# Patient Record
Sex: FEMALE | Race: WHITE | NOT HISPANIC OR LATINO | Employment: OTHER | URBAN - NONMETROPOLITAN AREA
[De-identification: names, ages, dates, MRNs, and addresses within clinical notes are randomized per-mention and may not be internally consistent; named-entity substitution may affect disease eponyms.]

---

## 2017-04-14 ENCOUNTER — DOCUMENTATION (OUTPATIENT)
Dept: GASTROENTEROLOGY | Facility: CLINIC | Age: 57
End: 2017-04-14

## 2017-04-14 NOTE — PROGRESS NOTES
Spoke to pt to see if she would still like to have a repeat carcinoid colon pt stated that she believed that she had already had it done and had her follow up but if not she would like to receive a letter about what she needed to have done.

## 2017-08-03 ENCOUNTER — TRANSCRIBE ORDERS (OUTPATIENT)
Dept: ADMINISTRATIVE | Facility: HOSPITAL | Age: 57
End: 2017-08-03

## 2017-08-03 ENCOUNTER — HOSPITAL ENCOUNTER (OUTPATIENT)
Dept: CT IMAGING | Facility: HOSPITAL | Age: 57
Discharge: HOME | End: 2017-08-03
Admitting: INTERNAL MEDICINE

## 2017-08-03 DIAGNOSIS — R93.3 ABNORMAL CT SCAN, GASTROINTESTINAL TRACT: Primary | ICD-10-CM

## 2017-08-03 DIAGNOSIS — R93.3 ABNORMAL CT SCAN, GASTROINTESTINAL TRACT: ICD-10-CM

## 2017-08-03 PROCEDURE — 0 IOPAMIDOL PER 1 ML: Performed by: INTERNAL MEDICINE

## 2017-08-03 PROCEDURE — 74178 CT ABD&PLV WO CNTR FLWD CNTR: CPT

## 2017-08-03 RX ADMIN — IOPAMIDOL 130 ML: 755 INJECTION, SOLUTION INTRAVENOUS at 17:30

## 2017-08-03 RX ADMIN — BARIUM SULFATE 1350 ML: 1 SUSPENSION ORAL at 17:30

## 2017-08-09 ENCOUNTER — DOCUMENTATION (OUTPATIENT)
Dept: GASTROENTEROLOGY | Facility: CLINIC | Age: 57
End: 2017-08-09

## 2017-08-09 NOTE — PROGRESS NOTES
Called patient, noticed she was on the recall for Dr. Trevizo's office. Called to see if she needs an appt or if she is following with another provider. Left message for her to call if she needs anything.

## 2017-08-31 ENCOUNTER — OFFICE VISIT (OUTPATIENT)
Dept: CARDIOLOGY | Facility: CLINIC | Age: 57
End: 2017-08-31

## 2017-08-31 VITALS
HEIGHT: 65 IN | WEIGHT: 147.4 LBS | DIASTOLIC BLOOD PRESSURE: 80 MMHG | BODY MASS INDEX: 24.56 KG/M2 | OXYGEN SATURATION: 98 % | SYSTOLIC BLOOD PRESSURE: 134 MMHG | HEART RATE: 91 BPM

## 2017-08-31 DIAGNOSIS — R06.02 SHORTNESS OF BREATH: Primary | ICD-10-CM

## 2017-08-31 DIAGNOSIS — I47.1 SVT (SUPRAVENTRICULAR TACHYCARDIA) (HCC): ICD-10-CM

## 2017-08-31 DIAGNOSIS — R00.2 PALPITATIONS: ICD-10-CM

## 2017-08-31 PROCEDURE — 99213 OFFICE O/P EST LOW 20 MIN: CPT | Performed by: PHYSICIAN ASSISTANT

## 2017-08-31 RX ORDER — DULOXETIN HYDROCHLORIDE 30 MG/1
30 CAPSULE, DELAYED RELEASE ORAL DAILY
COMMUNITY
Start: 2017-08-09

## 2017-08-31 RX ORDER — MAGNESIUM 200 MG
200 TABLET ORAL DAILY
COMMUNITY
End: 2021-12-14 | Stop reason: ALTCHOICE

## 2017-12-21 ENCOUNTER — APPOINTMENT (OUTPATIENT)
Dept: WOMENS IMAGING | Facility: HOSPITAL | Age: 57
End: 2017-12-21

## 2017-12-21 PROCEDURE — 77063 BREAST TOMOSYNTHESIS BI: CPT | Performed by: RADIOLOGY

## 2017-12-21 PROCEDURE — 77067 SCR MAMMO BI INCL CAD: CPT | Performed by: RADIOLOGY

## 2019-05-23 ENCOUNTER — APPOINTMENT (OUTPATIENT)
Dept: WOMENS IMAGING | Facility: HOSPITAL | Age: 59
End: 2019-05-23

## 2019-05-23 PROCEDURE — 77067 SCR MAMMO BI INCL CAD: CPT | Performed by: RADIOLOGY

## 2019-05-23 PROCEDURE — 77063 BREAST TOMOSYNTHESIS BI: CPT | Performed by: RADIOLOGY

## 2019-06-26 ENCOUNTER — TELEPHONE (OUTPATIENT)
Dept: GASTROENTEROLOGY | Facility: CLINIC | Age: 59
End: 2019-06-26

## 2019-07-01 NOTE — TELEPHONE ENCOUNTER
I faxed request back to pharmacy stating that she has not been seen and would need an appointment since I am unable to reach patient.

## 2020-06-23 ENCOUNTER — APPOINTMENT (OUTPATIENT)
Dept: WOMENS IMAGING | Facility: HOSPITAL | Age: 60
End: 2020-06-23

## 2020-06-23 PROCEDURE — 77066 DX MAMMO INCL CAD BI: CPT | Performed by: RADIOLOGY

## 2020-06-23 PROCEDURE — 76641 ULTRASOUND BREAST COMPLETE: CPT | Performed by: RADIOLOGY

## 2020-06-23 PROCEDURE — 77062 BREAST TOMOSYNTHESIS BI: CPT | Performed by: RADIOLOGY

## 2021-12-14 ENCOUNTER — OFFICE VISIT (OUTPATIENT)
Dept: CARDIOLOGY | Facility: CLINIC | Age: 61
End: 2021-12-14

## 2021-12-14 VITALS
TEMPERATURE: 97.5 F | WEIGHT: 166 LBS | HEART RATE: 79 BPM | BODY MASS INDEX: 27.66 KG/M2 | HEIGHT: 65 IN | SYSTOLIC BLOOD PRESSURE: 158 MMHG | DIASTOLIC BLOOD PRESSURE: 90 MMHG

## 2021-12-14 DIAGNOSIS — R42 DIZZINESS: ICD-10-CM

## 2021-12-14 DIAGNOSIS — C7A.012 MALIGNANT CARCINOID TUMOR OF ILEUM (HCC): ICD-10-CM

## 2021-12-14 DIAGNOSIS — E55.9 VITAMIN D DEFICIENCY: ICD-10-CM

## 2021-12-14 DIAGNOSIS — E78.00 HYPERCHOLESTEREMIA: ICD-10-CM

## 2021-12-14 DIAGNOSIS — R53.82 CHRONIC FATIGUE: ICD-10-CM

## 2021-12-14 DIAGNOSIS — R00.2 PALPITATIONS: ICD-10-CM

## 2021-12-14 DIAGNOSIS — E88.81 METABOLIC SYNDROME: ICD-10-CM

## 2021-12-14 DIAGNOSIS — G89.29 CHRONIC CHEST PAIN: Primary | ICD-10-CM

## 2021-12-14 DIAGNOSIS — I10 PRIMARY HYPERTENSION: ICD-10-CM

## 2021-12-14 DIAGNOSIS — E03.9 HYPOTHYROIDISM, UNSPECIFIED TYPE: ICD-10-CM

## 2021-12-14 DIAGNOSIS — R07.9 CHRONIC CHEST PAIN: Primary | ICD-10-CM

## 2021-12-14 PROBLEM — E88.810 METABOLIC SYNDROME: Status: ACTIVE | Noted: 2021-12-14

## 2021-12-14 PROCEDURE — 93000 ELECTROCARDIOGRAM COMPLETE: CPT | Performed by: INTERNAL MEDICINE

## 2021-12-14 PROCEDURE — 99204 OFFICE O/P NEW MOD 45 MIN: CPT | Performed by: INTERNAL MEDICINE

## 2021-12-14 RX ORDER — DICYCLOMINE HYDROCHLORIDE 10 MG/1
10 CAPSULE ORAL 2 TIMES DAILY
Qty: 60 CAPSULE | Refills: 3 | Status: SHIPPED | OUTPATIENT
Start: 2021-12-14 | End: 2022-04-13

## 2021-12-14 RX ORDER — NABUMETONE 500 MG/1
500 TABLET, FILM COATED ORAL 2 TIMES DAILY
COMMUNITY
End: 2022-12-20

## 2021-12-14 RX ORDER — MONTELUKAST SODIUM 10 MG/1
10 TABLET ORAL NIGHTLY
COMMUNITY

## 2021-12-14 RX ORDER — AZELASTINE 1 MG/ML
2 SPRAY, METERED NASAL 2 TIMES DAILY
COMMUNITY

## 2021-12-14 RX ORDER — LEVOCETIRIZINE DIHYDROCHLORIDE 5 MG/1
5 TABLET, FILM COATED ORAL EVERY EVENING
COMMUNITY

## 2021-12-14 RX ORDER — MULTIPLE VITAMINS W/ MINERALS TAB 9MG-400MCG
1 TAB ORAL DAILY
COMMUNITY

## 2021-12-14 RX ORDER — ATORVASTATIN CALCIUM 10 MG/1
10 TABLET, FILM COATED ORAL NIGHTLY
COMMUNITY

## 2021-12-14 RX ORDER — LEVOTHYROXINE SODIUM 0.05 MG/1
50 TABLET ORAL
COMMUNITY

## 2021-12-14 RX ORDER — EPINEPHRINE 0.3 MG/.3ML
0.3 INJECTION SUBCUTANEOUS ONCE
COMMUNITY

## 2021-12-14 RX ORDER — LISINOPRIL 10 MG/1
10 TABLET ORAL DAILY
COMMUNITY
End: 2022-05-11 | Stop reason: SINTOL

## 2021-12-14 RX ORDER — OMEPRAZOLE 20 MG/1
20 CAPSULE, DELAYED RELEASE ORAL DAILY
COMMUNITY
End: 2022-12-20

## 2021-12-14 NOTE — PATIENT INSTRUCTIONS
Mediterranean Diet  A Mediterranean diet refers to food and lifestyle choices that are based on the traditions of countries located on the [place]. This way of eating has been shown to help prevent certain conditions and improve outcomes for people who have chronic diseases, like kidney disease and heart disease.  What are tips for following this plan?  Lifestyle  · Cook and eat meals together with your family, when possible.  · Drink enough fluid to keep your urine clear or pale yellow.  · Be physically active every day. This includes:  ? Aerobic exercise like running or swimming.  ? Leisure activities like gardening, walking, or housework.  · Get 7-8 hours of sleep each night.  · If recommended by your health care provider, drink red wine in moderation. This means 1 glass a day for nonpregnant women and 2 glasses a day for men. A glass of wine equals 5 oz (150 mL).  Reading food labels    · Check the serving size of packaged foods. For foods such as rice and pasta, the serving size refers to the amount of cooked product, not dry.  · Check the total fat in packaged foods. Avoid foods that have saturated fat or trans fats.  · Check the ingredients list for added sugars, such as corn syrup.    Shopping  · At the grocery store, buy most of your food from the areas near the walls of the store. This includes:  ? Fresh fruits and vegetables (produce).  ? Grains, beans, nuts, and seeds. Some of these may be available in unpackaged forms or large amounts (in bulk).  ? Fresh seafood.  ? Poultry and eggs.  ? Low-fat dairy products.  · Buy whole ingredients instead of prepackaged foods.  · Buy fresh fruits and vegetables in-season from local farmers markets.  · Buy frozen fruits and vegetables in resealable bags.  · If you do not have access to quality fresh seafood, buy precooked frozen shrimp or canned fish, such as tuna, salmon, or sardines.  · Buy small amounts of raw or cooked vegetables, salads, or olives from  the deli or salad bar at your store.  · Stock your pantry so you always have certain foods on hand, such as olive oil, canned tuna, canned tomatoes, rice, pasta, and beans.  Cooking  · Cook foods with extra-virgin olive oil instead of using butter or other vegetable oils.  · Have meat as a side dish, and have vegetables or grains as your main dish. This means having meat in small portions or adding small amounts of meat to foods like pasta or stew.  · Use beans or vegetables instead of meat in common dishes like chili or lasagna.  · [place] with different cooking methods. Try roasting or broiling vegetables instead of steaming or sautéeing them.  · Add frozen vegetables to soups, stews, pasta, or rice.  · Add nuts or seeds for added healthy fat at each meal. You can add these to yogurt, salads, or vegetable dishes.  · Marinate fish or vegetables using olive oil, lemon juice, garlic, and fresh herbs.  Meal planning    · Plan to eat 1 vegetarian meal one day each week. Try to work up to 2 vegetarian meals, if possible.  · Eat seafood 2 or more times a week.  · Have healthy snacks readily available, such as:  ? Vegetable sticks with hummus.  ? Greek yogurt.  ? Fruit and nut trail mix.  · Eat balanced meals throughout the week. This includes:  ? Fruit: 2-3 servings a day  ? Vegetables: 4-5 servings a day  ? Low-fat dairy: 2 servings a day  ? Fish, poultry, or lean meat: 1 serving a day  ? Beans and legumes: 2 or more servings a week  ? Nuts and seeds: 1-2 servings a day  ? Whole grains: 6-8 servings a day  ? Extra-virgin olive oil: 3-4 servings a day  · Limit red meat and sweets to only a few servings a month    What are my food choices?  · Mediterranean diet  ? Recommended  § Grains: Whole-grain pasta. Brown rice. Bulgar wheat. Polenta. Couscous. Whole-wheat bread. Oatmeal. Quinoa.  § Vegetables: Artichokes. Beets. Broccoli. Cabbage. Carrots. Eggplant. Green beans. Chard. Kale. Spinach. Onions. Leeks. Peas. Squash.  Tomatoes. Peppers. Radishes.  § Fruits: Apples. Apricots. Avocado. Berries. Bananas. Cherries. Dates. Figs. Grapes. Rashid. Melon. Oranges. Peaches. Plums. Pomegranate.  § Meats and other protein foods: Beans. Almonds. Sunflower seeds. Pine nuts. Peanuts. Cod. [place]. Scallops. Shrimp. Tuna. Tilapia. Clams. Oysters. Eggs.  § Dairy: Low-fat milk. Cheese. Greek yogurt.  § Beverages: Water. Red wine. Herbal tea.  § Fats and oils: Extra virgin olive oil. Avocado oil. Grape seed oil.  § Sweets and desserts: Greek yogurt with honey. Baked apples. Poached pears. Trail mix.  § Seasoning and other foods: Basil. Cilantro. Coriander. Cumin. Mint. Parsley. Peter. Rosemary. Tarragon. Garlic. Oregano. Thyme. Pepper. Balsalmic vinegar. Tahini. Hummus. Tomato sauce. Olives. Mushrooms.  ? Limit these  § Grains: Prepackaged pasta or rice dishes. Prepackaged cereal with added sugar.  § Vegetables: Deep fried potatoes (french fries).  § Fruits: Fruit canned in syrup.  § Meats and other protein foods: Beef. Pork. Lamb. Poultry with skin. Hot dogs. [place].  § Dairy: Ice cream. Sour cream. Whole milk.  § Beverages: Juice. Sugar-sweetened soft drinks. Beer. Liquor and spirits.  § Fats and oils: Butter. Canola oil. Vegetable oil. Beef fat (tallow). Lard.  § Sweets and desserts: Cookies. Cakes. Pies. Candy.  § Seasoning and other foods: Mayonnaise. Premade sauces and marinades.  The items listed may not be a complete list. Talk with your dietitian about what dietary choices are right for you.  Summary  · The Mediterranean diet includes both food and lifestyle choices.  · Eat a variety of fresh fruits and vegetables, beans, nuts, seeds, and whole grains.  · Limit the amount of red meat and sweets that you eat.  · Talk with your health care provider about whether it is safe for you to drink red wine in moderation. This means 1 glass a day for nonpregnant women and 2 glasses a day for men. A glass of wine equals 5 oz (150 mL).  This information  is not intended to replace advice given to you by your health care provider. Make sure you discuss any questions you have with your health care provider.  Document Revised: 08/17/2017 Document Reviewed: 08/10/2017  Elsevier Patient Education © 2020 [employer]

## 2021-12-14 NOTE — PROGRESS NOTES
Chief Complaint   Patient presents with   • Establish Care     Referred per PCP for HTN  and chest pressure . Patient had previously been seen by  ,had  stress test 2014.  patient had been seen by you many years ago.    • Chest Pain     Describes chest pressure and sharp pain at mid chest .   • Hypertension     Patient reports her Blood pressure has been elevated for months. at times Systolic 200 and [id number]   • Dizziness     Reports she has dizzziness , which she feels could be side effect from Lisinopril ( which she has just started )   • LABS     Has labs June 2021 in referral pack. Had labs november 2021 per PCP ,I will call for results.        CARDIAC COMPLAINTS  chest pressure/discomfort, dyspnea and Dizziness      Subjective   Radha Villa is a 61 y.o. female came in today for her initial cardiac evaluation.  She has history of hypertension, hypothyroidism, hypercholesterolemia who has been having symptoms of chest pain, dizziness, shortness of breath.  The chest pain is a sharp pain in the middle part of the chest associated with the chest tightness.  The symptoms occurs both during exertion as well as at rest.  It last for few minutes and subsides spontaneously.  She also has been noticing increasing her dyspnea and having some episodes of dizziness.  She has been diagnosed with hypertension and lisinopril was started.  She used to have systolic blood pressure more than 200 and diastolic of more than 100 and after being on the lisinopril her blood pressure is doing better but she started noticing increasing the dizziness.  She also had some lab work done and found her blood sugar was borderline elevated.  Her renal function was mildly reduced at 58.  Her cholesterol is 185 with the LDL around 95 and triglyceride of 161.  Her vitamin levels were normal and the TSH was normal.  She has history of carcinoid tumor for which he underwent surgery many years ago.  His carcinoid tumor was in the  ileum diagnosed after being in the hospital and was seen by multiple surgeons and gastroenterologist for abdominal pain.  She also has been noticing increasing fatigue and tired.  She wakes up in the morning feeling still tired.  She has no history of smoking or drinking alcohol.  She does have a family history of heart disease and atrial fibrillation mostly in the mother side    Past Surgical History:   Procedure Laterality Date   • APPENDECTOMY     • CARDIOVASCULAR STRESS TEST  04/11/2002    @ [facility].- 72% THR. EF 71%. Negative   • CARDIOVASCULAR STRESS TEST  08/25/2014    Dr. Cantrell- 6 Min.30 Sec. 7.6 METS. 81% THR.EF 77%. Negative   • CHOLECYSTECTOMY     • COLON SURGERY     • COLONOSCOPY     • COLONOSCOPY N/A 9/13/2016    Procedure: COLONOSCOPY FOR SCREENING CPT CODE: ;  Surgeon: Milagros Trevizo DO;  Location: [facility];  Service:    • DILATION AND CURETTAGE, DIAGNOSTIC / THERAPEUTIC     • EYE SURGERY     • HYSTERECTOMY         Current Outpatient Medications   Medication Sig Dispense Refill   • atorvastatin (LIPITOR) 10 MG tablet Take 10 mg by mouth Every Night.     • azelastine (ASTELIN) 0.1 % nasal spray 2 sprays into the nostril(s) as directed by provider 2 (Two) Times a Day. Use in each nostril as directed     • Cholecalciferol (Vitamin D3) 1.25 MG (46895 UT) tablet Take 1 tablet by mouth 1 (One) Time Per Week.     • DULoxetine (CYMBALTA) 30 MG capsule Take 30 mg by mouth Daily.     • ELDERBERRY PO Take  by mouth As Needed.     • EPINEPHrine (EPIPEN) 0.3 MG/0.3ML solution auto-injector injection Inject 0.3 mg under the skin into the appropriate area as directed 1 (One) Time.     • levocetirizine (XYZAL) 5 MG tablet Take 5 mg by mouth Every Evening.     • levothyroxine (SYNTHROID, LEVOTHROID) 50 MCG tablet Take 50 mcg by mouth Every Morning.     • lidocaine-hydrocortisone 3-0.5 % cream rectal cream Use rectally twice daily as needed. 1 tube 2   • lisinopril (PRINIVIL,ZESTRIL) 10 MG tablet Take  10 mg by mouth Daily.     • montelukast (SINGULAIR) 10 MG tablet Take 10 mg by mouth Every Night.     • Multiple Vitamins-Minerals (EMERGEN-C IMMUNE PLUS PO) Take  by mouth As Needed.     • multivitamin with minerals (MULTIVITAMIN ADULT PO) Take 1 tablet by mouth Daily.     • nabumetone (RELAFEN) 500 MG tablet Take 500 mg by mouth 2 (Two) Times a Day.     • nitroglycerin (NITROSTAT) 0.4 MG SL tablet Place 0.4 mg under the tongue Every 5 (Five) Minutes As Needed.     • omeprazole (priLOSEC) 20 MG capsule Take 20 mg by mouth Daily.     • triamcinolone (KENALOG) 0.1 % cream Apply  topically.     • dicyclomine (Bentyl) 10 MG capsule Take 1 capsule by mouth 2 (Two) Times a Day. 60 capsule 3     No current facility-administered medications for this visit.           ALLERGIES:  Codeine    Past Medical History:   Diagnosis Date   • Arthritis    • Borderline diabetic    • Cancer (HCC)     CARCINOID TUMOR   • Carcinoid tumor     ileum   • Diabetes mellitus (HCC)     BORDERLINE   • Hyperlipidemia    • Hypertension    • Hypothyroidism    • Hypothyroidism 12/14/2021   • PONV (postoperative nausea and vomiting)    • Regurgitation    • Tachycardia    • Vitamin D deficiency        Social History     Tobacco Use   Smoking Status Never Smoker   Smokeless Tobacco Never Used          Family History   Problem Relation Age of Onset   • Heart attack Other    • Diabetes Other    • Stroke Other    • Cancer Other    • Heart disease Other         Cardiac disorder   • Heart disease Mother    • Atrial fibrillation Mother    • Skin cancer Mother    • Heart disease Maternal Grandfather    • Heart disease Paternal Grandmother        Review of Systems   Constitutional: Positive for malaise/fatigue. Negative for decreased appetite.   HENT: Negative for congestion and sore throat.    Eyes: Negative for blurred vision.   Cardiovascular: Positive for chest pain, dyspnea on exertion and palpitations.   Respiratory: Positive for shortness of breath.  "Negative for snoring.    Endocrine: Negative for cold intolerance and heat intolerance.   Hematologic/Lymphatic: Negative for adenopathy. Does not bruise/bleed easily.   Skin: Negative for itching, nail changes and skin cancer.   Musculoskeletal: Negative for arthritis and myalgias.   Gastrointestinal: Negative for abdominal pain, dysphagia and heartburn.   Genitourinary: Negative for bladder incontinence and frequency.   Neurological: Negative for dizziness, light-headedness, seizures and vertigo.   Psychiatric/Behavioral: Negative for altered mental status.   Allergic/Immunologic: Negative for environmental allergies and hives.       Diabetes- No  Thyroid- abnormal    Objective     /90 (BP Location: Left arm)   Pulse 79   Temp 97.5 °F (36.4 °C)   Ht 165.1 cm (65\")   Wt 75.3 kg (166 lb)   BMI 27.62 kg/m²     Vitals and nursing note reviewed.   Constitutional:       Appearance: Healthy appearance. Not in distress.   Eyes:      Conjunctiva/sclera: Conjunctivae normal.      Pupils: Pupils are equal, round, and reactive to light.   HENT:      Head: Normocephalic.   Pulmonary:      Effort: Pulmonary effort is normal.      Breath sounds: Normal breath sounds.   Cardiovascular:      PMI at left midclavicular line. Normal rate. Regular rhythm.      Murmurs: There is a systolic murmur.   Abdominal:      General: Bowel sounds are normal.      Palpations: Abdomen is soft.   Musculoskeletal: Normal range of motion.      Cervical back: Normal range of motion and neck supple. Skin:     General: Skin is warm and dry.   Neurological:      Mental Status: Alert, oriented to person, place, and time and oriented to person, place and time.           ECG 12 Lead    Date/Time: 12/14/2021 1:35 PM  Performed by: Deya Ang MD  Authorized by: Deya Ang MD   Comparison: compared with previous ECG from 11/19/2021  Similar to previous ECG  Rhythm: sinus rhythm  Rate: normal  Other findings: non-specific ST-T " wave changes    Clinical impression: non-specific ECG              Assessment/Plan   Patient's Body mass index is 27.62 kg/m². indicating that she is overweight (BMI 25-29.9). Obesity-related health conditions include the following: hypertension and dyslipidemias. Obesity is newly identified. BMI is is above average; BMI management plan is completed. We discussed low calorie, low carb based diet program, portion control and increasing exercise..     Diagnoses and all orders for this visit:    1. Chronic chest pain (Primary)  -     Stress Test With Myocardial Perfusion One Day; Future  -     Adult Transthoracic Echo Complete W/ Cont if Necessary Per Protocol; Future  -     Holter Monitor - 72 Hour Up To 15 Days; Future  -     dicyclomine (Bentyl) 10 MG capsule; Take 1 capsule by mouth 2 (Two) Times a Day.  Dispense: 60 capsule; Refill: 3    2. Primary hypertension    3. Hypercholesteremia  -     Stress Test With Myocardial Perfusion One Day; Future    4. Hypothyroidism, unspecified type    5. Malignant carcinoid tumor of ileum (HCC)    6. Vitamin D deficiency    7. Dizziness  -     Adult Transthoracic Echo Complete W/ Cont if Necessary Per Protocol; Future  -     Holter Monitor - 72 Hour Up To 15 Days; Future    8. Chronic fatigue  -     Overnight Sleep Oximetry Study; Future    9. Palpitations  -     Holter Monitor - 72 Hour Up To 15 Days; Future    10. Metabolic syndrome    At baseline her heart rate is stable.  Her blood pressure is upper limit of normal.  Her EKG done today shows sinus rhythm with nonspecific ST-T changes.  Her clinical examination reveals a BMI of 28.  Her cardiovascular examination is unremarkable other than a short systolic murmur at the mitral area.    Regarding the chest pain, it appears to be atypical.  It could be secondary to her reflux for which she is already on Prilosec.  She does have multiple risk factors for CAD which includes blood pressure, cholesterol and family history.  I  scheduled her to undergo a stress test to evaluate her functional status, chronotropic response, blood pressure response and rule out any stress-induced ischemia.  I also schedule her to undergo an echocardiogram to evaluate the LV function, valvular structures as well as the PA pressure.  Meanwhile I started her on Bentyl 10 mg to be taken twice a day.    Regarding her hypertension, lisinopril seems to be adequately controlling the blood pressure with mild elevation when she goes to the doctor's office.  During the stress test if she does have increased chronotropic as well as blood pressure response then low-dose of beta-blockers may be added.    Regarding her hypercholesterolemia, she is on Lipitor at 10 mg and appears to have a normal levels.  Continue the same    Regarding her hypothyroidism, she is taking supplements and the level seems to be normal    Regarding the carcinoid tumor of the ileum, she had surgery done without complication and she is not having any symptoms at this time    Regarding her dizziness, I like to get Holter monitor placed since he also has lot of palpitation.  This is to evaluate for any arrhythmia as well as will review the echocardiogram    Regarding the fatigue and tiredness, associated with the mild obesity, need to rule out sleep apnea.  Advised her to check her nocturnal pulse PO2 measurement.  I also talked to her about diet especially Mediterranean diet.    Based on the results, further recommendations will be made.               Electronically signed by Deya Ang MD December 14, 2021 13:34 EST

## 2021-12-22 ENCOUNTER — HOSPITAL ENCOUNTER (OUTPATIENT)
Dept: CARDIOLOGY | Facility: HOSPITAL | Age: 61
Discharge: HOME | End: 2021-12-22

## 2021-12-22 DIAGNOSIS — E78.00 HYPERCHOLESTEREMIA: ICD-10-CM

## 2021-12-22 DIAGNOSIS — G89.29 CHRONIC CHEST PAIN: ICD-10-CM

## 2021-12-22 DIAGNOSIS — R07.9 CHRONIC CHEST PAIN: ICD-10-CM

## 2021-12-22 DIAGNOSIS — R42 DIZZINESS: ICD-10-CM

## 2021-12-22 LAB
BH CV ECHO MEAS - ACS: 1.8 CM
BH CV ECHO MEAS - AO MAX PG: 3.9 MMHG
BH CV ECHO MEAS - AO MEAN PG: 2 MMHG
BH CV ECHO MEAS - AO ROOT AREA (BSA CORRECTED): 1.5
BH CV ECHO MEAS - AO ROOT AREA: 5.7 CM^2
BH CV ECHO MEAS - AO ROOT DIAM: 2.7 CM
BH CV ECHO MEAS - AO V2 MAX: 99 CM/SEC
BH CV ECHO MEAS - AO V2 MEAN: 73.1 CM/SEC
BH CV ECHO MEAS - AO V2 VTI: 23.4 CM
BH CV ECHO MEAS - BSA(HAYCOCK): 1.9 M^2
BH CV ECHO MEAS - BSA: 1.8 M^2
BH CV ECHO MEAS - BZI_BMI: 27.6 KILOGRAMS/M^2
BH CV ECHO MEAS - BZI_METRIC_HEIGHT: 165.1 CM
BH CV ECHO MEAS - BZI_METRIC_WEIGHT: 75.3 KG
BH CV ECHO MEAS - EDV(CUBED): 32.2 ML
BH CV ECHO MEAS - EDV(MOD-SP4): 66.9 ML
BH CV ECHO MEAS - EDV(TEICH): 40.3 ML
BH CV ECHO MEAS - EF(CUBED): 68.2 %
BH CV ECHO MEAS - EF(MOD-SP4): 54.6 %
BH CV ECHO MEAS - EF(TEICH): 61.2 %
BH CV ECHO MEAS - EF_3D-VOL: 63 %
BH CV ECHO MEAS - ESV(CUBED): 10.2 ML
BH CV ECHO MEAS - ESV(MOD-SP4): 30.4 ML
BH CV ECHO MEAS - ESV(TEICH): 15.7 ML
BH CV ECHO MEAS - FS: 31.8 %
BH CV ECHO MEAS - IVS/LVPW: 1.2
BH CV ECHO MEAS - IVSD: 1.9 CM
BH CV ECHO MEAS - LA DIMENSION: 3.3 CM
BH CV ECHO MEAS - LA/AO: 1.2
BH CV ECHO MEAS - LV DIASTOLIC VOL/BSA (35-75): 36.6 ML/M^2
BH CV ECHO MEAS - LV IVRT: 0.11 SEC
BH CV ECHO MEAS - LV MASS(C)D: 205.5 GRAMS
BH CV ECHO MEAS - LV MASS(C)DI: 112.5 GRAMS/M^2
BH CV ECHO MEAS - LV SYSTOLIC VOL/BSA (12-30): 16.6 ML/M^2
BH CV ECHO MEAS - LVIDD: 3.2 CM
BH CV ECHO MEAS - LVIDS: 2.2 CM
BH CV ECHO MEAS - LVLD AP4: 7 CM
BH CV ECHO MEAS - LVLS AP4: 6.3 CM
BH CV ECHO MEAS - LVOT AREA (M): 3.1 CM^2
BH CV ECHO MEAS - LVOT AREA: 3.1 CM^2
BH CV ECHO MEAS - LVOT DIAM: 2 CM
BH CV ECHO MEAS - LVPWD: 1.5 CM
BH CV ECHO MEAS - MV A MAX VEL: 102 CM/SEC
BH CV ECHO MEAS - MV DEC SLOPE: 596 CM/SEC^2
BH CV ECHO MEAS - MV E MAX VEL: 114 CM/SEC
BH CV ECHO MEAS - MV E/A: 1.1
BH CV ECHO MEAS - RAP SYSTOLE: 10 MMHG
BH CV ECHO MEAS - RVDD: 2.7 CM
BH CV ECHO MEAS - RVSP: 33.4 MMHG
BH CV ECHO MEAS - SI(AO): 73.3 ML/M^2
BH CV ECHO MEAS - SI(CUBED): 12 ML/M^2
BH CV ECHO MEAS - SI(MOD-SP4): 20 ML/M^2
BH CV ECHO MEAS - SI(TEICH): 13.5 ML/M^2
BH CV ECHO MEAS - SV(AO): 134 ML
BH CV ECHO MEAS - SV(CUBED): 21.9 ML
BH CV ECHO MEAS - SV(MOD-SP4): 36.5 ML
BH CV ECHO MEAS - SV(TEICH): 24.7 ML
BH CV ECHO MEAS - TR MAX VEL: 242 CM/SEC
BH CV REST NUCLEAR ISOTOPE DOSE: 10 MCI
BH CV STRESS NUCLEAR ISOTOPE DOSE: 30 MCI
BH CV STRESS RECOVERY BP: NORMAL MMHG
BH CV STRESS RECOVERY HR: 74 BPM
LV EF NUC BP: 81 %
MAXIMAL PREDICTED HEART RATE: 157 BPM
MAXIMAL PREDICTED HEART RATE: 157 BPM
PERCENT MAX PREDICTED HR: 85.35 %
STRESS BASELINE BP: NORMAL MMHG
STRESS BASELINE HR: 70 BPM
STRESS PERCENT HR: 100 %
STRESS POST ESTIMATED WORKLOAD: 7 METS
STRESS POST EXERCISE DUR MIN: 6 MIN
STRESS POST EXERCISE DUR SEC: 0 SEC
STRESS POST PEAK BP: NORMAL MMHG
STRESS POST PEAK HR: 134 BPM
STRESS TARGET HR: 133 BPM
STRESS TARGET HR: 133 BPM

## 2021-12-22 PROCEDURE — 78452 HT MUSCLE IMAGE SPECT MULT: CPT | Performed by: INTERNAL MEDICINE

## 2021-12-22 PROCEDURE — 93306 TTE W/DOPPLER COMPLETE: CPT

## 2021-12-22 PROCEDURE — 93306 TTE W/DOPPLER COMPLETE: CPT | Performed by: INTERNAL MEDICINE

## 2021-12-22 PROCEDURE — 0 TECHNETIUM SESTAMIBI: Performed by: INTERNAL MEDICINE

## 2021-12-22 PROCEDURE — A9500 TC99M SESTAMIBI: HCPCS | Performed by: INTERNAL MEDICINE

## 2021-12-22 PROCEDURE — 78452 HT MUSCLE IMAGE SPECT MULT: CPT

## 2021-12-22 PROCEDURE — 93018 CV STRESS TEST I&R ONLY: CPT | Performed by: INTERNAL MEDICINE

## 2021-12-22 PROCEDURE — 93017 CV STRESS TEST TRACING ONLY: CPT

## 2021-12-22 RX ADMIN — TECHNETIUM TC 99M SESTAMIBI 1 DOSE: 1 INJECTION INTRAVENOUS at 11:36

## 2021-12-22 RX ADMIN — TECHNETIUM TC 99M SESTAMIBI 1 DOSE: 1 INJECTION INTRAVENOUS at 08:27

## 2022-01-03 ENCOUNTER — TELEPHONE (OUTPATIENT)
Dept: CARDIOLOGY | Facility: CLINIC | Age: 62
End: 2022-01-03

## 2022-01-03 NOTE — TELEPHONE ENCOUNTER
Received fax from Dr. Trevizo for cardiac clearance for patient to have an EGD and colonoscopy. According to our records, I do not see where patient is on any blood thinners or has had any stenting.      Fax [phone]

## 2022-01-11 RX ORDER — METOPROLOL SUCCINATE 25 MG/1
25 TABLET, EXTENDED RELEASE ORAL DAILY
Qty: 90 TABLET | Refills: 3 | Status: SHIPPED | OUTPATIENT
Start: 2022-01-11 | End: 2022-06-15

## 2022-01-11 NOTE — TELEPHONE ENCOUNTER
Pt aware of monitor results and recommendations to add Toprol XL 25 mg daily.  She would like script sent to [facility]. Aware to monitor vitals and call if not within normal range. Understanding voiced.

## 2022-01-17 ENCOUNTER — APPOINTMENT (OUTPATIENT)
Dept: CARDIOLOGY | Facility: HOSPITAL | Age: 62
End: 2022-01-17

## 2022-04-13 DIAGNOSIS — R07.9 CHRONIC CHEST PAIN: ICD-10-CM

## 2022-04-13 DIAGNOSIS — G89.29 CHRONIC CHEST PAIN: ICD-10-CM

## 2022-04-13 RX ORDER — DICYCLOMINE HYDROCHLORIDE 10 MG/1
CAPSULE ORAL
Qty: 60 CAPSULE | Refills: 3 | Status: SHIPPED | OUTPATIENT
Start: 2022-04-13 | End: 2022-08-16

## 2022-05-11 NOTE — TELEPHONE ENCOUNTER
Pt called. BP has been elevated. 's -160's / 90's to 100's consistently.  Pulse 70's-80's.  PCP a while back had to change the Lisinopril to amlodipine due to a cough.    Current meds include:      Amlodipine 5 mg daily  Metoprolol succ 25 mg daily

## 2022-05-12 RX ORDER — AMLODIPINE BESYLATE 10 MG/1
10 TABLET ORAL DAILY
Qty: 90 TABLET | Refills: 3 | Status: SHIPPED | OUTPATIENT
Start: 2022-05-12 | End: 2022-06-15

## 2022-05-12 NOTE — TELEPHONE ENCOUNTER
Pt called back. Aware to increase amlodipine to 10 mg daily. She is going to take 2 of the 5 mg tabs daily until she picks up her new 10 mg tablet script. Understanding voiced.

## 2022-06-15 ENCOUNTER — OFFICE VISIT (OUTPATIENT)
Dept: CARDIOLOGY | Facility: CLINIC | Age: 62
End: 2022-06-15

## 2022-06-15 VITALS
SYSTOLIC BLOOD PRESSURE: 126 MMHG | HEART RATE: 76 BPM | HEIGHT: 65 IN | WEIGHT: 170 LBS | DIASTOLIC BLOOD PRESSURE: 74 MMHG | BODY MASS INDEX: 28.32 KG/M2

## 2022-06-15 DIAGNOSIS — R00.2 PALPITATIONS: ICD-10-CM

## 2022-06-15 DIAGNOSIS — R60.0 BILATERAL EDEMA OF LOWER EXTREMITY: ICD-10-CM

## 2022-06-15 DIAGNOSIS — R07.89 OTHER CHEST PAIN: Primary | ICD-10-CM

## 2022-06-15 DIAGNOSIS — R00.0 SINUS TACHYCARDIA: ICD-10-CM

## 2022-06-15 DIAGNOSIS — E78.00 HYPERCHOLESTEREMIA: ICD-10-CM

## 2022-06-15 DIAGNOSIS — R12 HEARTBURN: ICD-10-CM

## 2022-06-15 DIAGNOSIS — I10 PRIMARY HYPERTENSION: ICD-10-CM

## 2022-06-15 DIAGNOSIS — I47.1 SVT (SUPRAVENTRICULAR TACHYCARDIA): ICD-10-CM

## 2022-06-15 PROCEDURE — 99214 OFFICE O/P EST MOD 30 MIN: CPT | Performed by: NURSE PRACTITIONER

## 2022-06-15 RX ORDER — HYDROCHLOROTHIAZIDE 12.5 MG/1
12.5 TABLET ORAL DAILY
Qty: 30 TABLET | Refills: 6 | Status: SHIPPED | OUTPATIENT
Start: 2022-06-15 | End: 2022-12-20 | Stop reason: SDUPTHER

## 2022-06-15 RX ORDER — METOPROLOL SUCCINATE 50 MG/1
50 TABLET, EXTENDED RELEASE ORAL DAILY
Qty: 30 TABLET | Refills: 11
Start: 2022-06-15 | End: 2022-08-22

## 2022-06-15 RX ORDER — AMLODIPINE BESYLATE 5 MG/1
5 TABLET ORAL DAILY
Qty: 30 TABLET | Refills: 11 | Status: SHIPPED | OUTPATIENT
Start: 2022-06-15 | End: 2022-12-20 | Stop reason: SDUPTHER

## 2022-06-15 RX ORDER — FAMOTIDINE 20 MG/1
20 TABLET, FILM COATED ORAL 2 TIMES DAILY
COMMUNITY

## 2022-06-15 NOTE — PROGRESS NOTES
Chief Complaint   Patient presents with   • Follow-up     Cardiac management   • LABS     November 2021 on chart. Has had labs per PCP since   • Hypertension     Has BP log today, BP is running high  every day   • Edema     Has  swelling to legs , and has puffiness to hands    • Chest Pain     Describes chest heaviness and dull  pain mid chest that radiate in shoulder and down left arm   • Med Refill     Needs refills on Bentyl  90 day supply to [facility] . Had med list today       Subjective       Radha Villa is a 62 y.o. female seen in December 2021 for initial cardiac evaluation.  She has hypertension, hypothyroidism, and hypercholesterolemia.  She was seen for evaluation of chest pain, dizziness, shortness of breath.  Dicyclomine was added for GI symptoms.  Nuclear stress test, echocardiogram, cardiac monitor, and overnight oxygen monitor were ordered.  Stress test revealed normal myocardial perfusion study, echocardiogram showed mild LVH with a EF of 56 to 60%.  Mild MR and TR noted.  RVSP was 33 mmHg. 14-day cardiac monitor showed baseline sinus rhythm with rare PAC and PVCs.  One 4 beat run V. tach noted.  There was 18 beat run of SVT.  No pauses recorded.  The patient admitted to chest pain and palpitations with no EKG changes seen.  Overnight oxygen monitor results did not show significant nocturnal desaturation.  Later cardiac clearance was given for GI work-up.    Today she is seen for follow-up visit and reports the following:    She states that her heart rate used to be real high, but now better. The patient reports that it was 102 the other morning when she was getting up for the day, which is unusual. She journals the readings, when they are higher than her normal. The patient reports that she did have one of her more worse bowel movements that morning.      She states that she has never had a sleep study performed.     The patient reports that since the amlodipine was increased from 5 to 10, she  is starting to notice good results. She is also taking Toprol XL 25 mg daily. Her blood pressure today is perfect. She checks her blood pressure at home and she would like to bring in her monitor to make sure that it is properly calibrated. The patient was unable to tolerate lisinopril in the past.    She states that she is trying to lose at least 10 pounds. She had loss about 4 to 5 pounds. The patient is monitoring her diet, glucose, and carbohydrate intake.     The patient reports that she feels tenderness and swelling in her lower legs. Yesterday she was outside mowing in the sun when she noticed the swelling, but it should be resolved by now. She states that she noticed this problem, when she started to gain the extra weight. The patient will sometimes wear support socks during the day.    Cardiac History:        Past Surgical History:   Procedure Laterality Date   • APPENDECTOMY     • CARDIOVASCULAR STRESS TEST  04/11/2002    @ [facility].- 72% THR. EF 71%. Negative   • CARDIOVASCULAR STRESS TEST  08/25/2014    Dr. Cantrell- 6 Min.30 Sec. 7.6 METS. 81% THR.EF 77%. Negative   • CARDIOVASCULAR STRESS TEST  12/22/2021    6 Min. 7.00 METS. 85% THR. BP- 153/85. EF > 70%. Negative   • CHOLECYSTECTOMY     • COLON SURGERY     • COLONOSCOPY     • COLONOSCOPY N/A 9/13/2016    Procedure: COLONOSCOPY FOR SCREENING CPT CODE: ;  Surgeon: Milagros Trevizo DO;  Location: [facility];  Service:    • CONVERTED (HISTORICAL) HOLTER  01/10/2022    14 Days. Avg 87. . One run of VT & SVT.   • DILATION AND CURETTAGE, DIAGNOSTIC / THERAPEUTIC     • ECHO - CONVERTED  12/22/2021    EF 60%. Mild MR. RVSP- 34 mmHg   • EYE SURGERY     • HYSTERECTOMY     • OTHER SURGICAL HISTORY  12/16/2021    Pulse O2- Normal       Current Outpatient Medications   Medication Sig Dispense Refill   • atorvastatin (LIPITOR) 10 MG tablet Take 10 mg by mouth Every Night.     • azelastine (ASTELIN) 0.1 % nasal spray 2 sprays into the nostril(s) as  directed by provider 2 (Two) Times a Day. Use in each nostril as directed     • Cholecalciferol (Vitamin D3) 1.25 MG (03240 UT) tablet Take 1 tablet by mouth 1 (One) Time Per Week.     • dicyclomine (BENTYL) 10 MG capsule TAKE ONE CAPSULE BY MOUTH TWICE A DAY 60 capsule 3   • DULoxetine (CYMBALTA) 30 MG capsule Take 30 mg by mouth Daily.     • ELDERBERRY PO Take  by mouth As Needed.     • EPINEPHrine (EPIPEN) 0.3 MG/0.3ML solution auto-injector injection Inject 0.3 mg under the skin into the appropriate area as directed 1 (One) Time.     • famotidine (PEPCID) 20 MG tablet Take 20 mg by mouth 2 (Two) Times a Day.     • levocetirizine (XYZAL) 5 MG tablet Take 5 mg by mouth Every Evening.     • levothyroxine (SYNTHROID, LEVOTHROID) 50 MCG tablet Take 50 mcg by mouth Every Morning.     • lidocaine-hydrocortisone 3-0.5 % cream rectal cream Use rectally twice daily as needed. 1 tube 2   • montelukast (SINGULAIR) 10 MG tablet Take 10 mg by mouth Every Night.     • Multiple Vitamins-Minerals (EMERGEN-C IMMUNE PLUS PO) Take  by mouth As Needed.     • multivitamin with minerals tablet tablet Take 1 tablet by mouth Daily.     • nabumetone (RELAFEN) 500 MG tablet Take 500 mg by mouth 2 (Two) Times a Day.     • omeprazole (priLOSEC) 20 MG capsule Take 20 mg by mouth Daily.     • triamcinolone (KENALOG) 0.1 % cream Apply  topically.     • amLODIPine (NORVASC) 5 MG tablet Take 1 tablet by mouth Daily. 30 tablet 11   • hydroCHLOROthiazide (HYDRODIURIL) 12.5 MG tablet Take 1 tablet by mouth Daily. 30 tablet 6   • metoprolol succinate XL (TOPROL-XL) 50 MG 24 hr tablet Take 1 tablet by mouth Daily. 30 tablet 11   • nitroglycerin (NITROSTAT) 0.4 MG SL tablet Place 0.4 mg under the tongue Every 5 (Five) Minutes As Needed.       No current facility-administered medications for this visit.       Codeine and Lisinopril    Past Medical History:   Diagnosis Date   • Alpha-galactosidase A deficiency (HCC)    • Arthritis    • Borderline  diabetic    • Cancer (HCC)     CARCINOID TUMOR   • Carcinoid tumor     ileum   • Diabetes mellitus (HCC)     BORDERLINE   • Hyperlipidemia    • Hypertension    • Hypothyroidism    • Hypothyroidism 12/14/2021   • PONV (postoperative nausea and vomiting)    • Regurgitation    • Tachycardia    • Vitamin D deficiency        Social History     Socioeconomic History   • Marital status:    Tobacco Use   • Smoking status: Never Smoker   • Smokeless tobacco: Never Used   Vaping Use   • Vaping Use: Never used   Substance and Sexual Activity   • Alcohol use: No   • Drug use: No   • Sexual activity: Defer       Family History   Problem Relation Age of Onset   • Heart attack Other    • Diabetes Other    • Stroke Other    • Cancer Other    • Heart disease Other         Cardiac disorder   • Heart disease Mother    • Atrial fibrillation Mother    • Skin cancer Mother    • Heart disease Maternal Grandfather    • Heart disease Paternal Grandmother        Review of Systems   Constitutional: Negative for decreased appetite, diaphoresis and malaise/fatigue.   HENT: Negative for nosebleeds.    Eyes: Negative for blurred vision.   Cardiovascular: Negative for chest pain, claudication, cyanosis, dyspnea on exertion, irregular heartbeat, leg swelling (mild, lower legs), near-syncope, orthopnea, palpitations, paroxysmal nocturnal dyspnea and syncope.   Respiratory: Negative for shortness of breath.    Endocrine: Negative for cold intolerance and heat intolerance.   Hematologic/Lymphatic: Does not bruise/bleed easily.   Skin: Negative for rash.   Musculoskeletal: Negative for myalgias.   Gastrointestinal: Negative for heartburn, melena and nausea.   Genitourinary: Negative for dysuria and hematuria.   Neurological: Negative for dizziness and light-headedness.   Psychiatric/Behavioral: The patient does not have insomnia and is not nervous/anxious.         BP Readings from Last 5 Encounters:   06/15/22 126/74   12/14/21 158/90   08/31/17  "134/80   10/25/16 139/84   09/13/16 117/72       Wt Readings from Last 5 Encounters:   06/15/22 77.1 kg (170 lb)   12/14/21 75.3 kg (166 lb)   08/31/17 66.9 kg (147 lb 6.4 oz)   10/25/16 71.7 kg (158 lb)   09/13/16 72.6 kg (160 lb)       Objective     /74 (BP Location: Right arm)   Pulse 76   Ht 165.1 cm (65\")   Wt 77.1 kg (170 lb)   BMI 28.29 kg/m²     Vitals and nursing note reviewed.   Eyes:      Pupils: Pupils are equal, round, and reactive to light.   HENT:      Head: Normocephalic.   Neck:      Vascular: No carotid bruit.   Pulmonary:      Effort: Pulmonary effort is normal.      Breath sounds: Normal breath sounds.   Cardiovascular:      PMI at left midclavicular line. Normal rate. Regular rhythm.      Murmurs: There is a grade 2 to 1/6 systolic murmur.      Comments: Tenderness located in her lower extremities. Bowel sounds were normal. Abdomen was nontender.  Pulses:     Intact distal pulses.   Edema:     Peripheral edema present.     Ankle: bilateral trace edema of the ankle.     Feet: bilateral trace edema of the feet.  Abdominal:      General: Bowel sounds are normal.      Palpations: Abdomen is soft.   Musculoskeletal: Normal range of motion.      Cervical back: Normal range of motion. Skin:     General: Skin is warm.   Neurological:      Mental Status: Alert and oriented to person, place, and time.            Procedures: none today          Assessment & Plan   Diagnoses and all orders for this visit:    1. Other chest pain (Primary)    2. Hypercholesteremia    3. Palpitations  -     metoprolol succinate XL (TOPROL-XL) 50 MG 24 hr tablet; Take 1 tablet by mouth Daily.  Dispense: 30 tablet; Refill: 11    4. Sinus tachycardia  -     metoprolol succinate XL (TOPROL-XL) 50 MG 24 hr tablet; Take 1 tablet by mouth Daily.  Dispense: 30 tablet; Refill: 11    5. SVT (supraventricular tachycardia) (HCC)  -     metoprolol succinate XL (TOPROL-XL) 50 MG 24 hr tablet; Take 1 tablet by mouth Daily.  " Dispense: 30 tablet; Refill: 11    6. Primary hypertension  -     amLODIPine (NORVASC) 5 MG tablet; Take 1 tablet by mouth Daily.  Dispense: 30 tablet; Refill: 11    7. Bilateral edema of lower extremity  -     hydroCHLOROthiazide (HYDRODIURIL) 12.5 MG tablet; Take 1 tablet by mouth Daily.  Dispense: 30 tablet; Refill: 6    8. Heartburn      Increased heart rate  - The patient will continue to monitor her vital signs.   - If she starts noticing more of these high numbers over 100, she will let us know.     Hypertension  - Her blood pressure is well controlled at this time.   - She will come in for a blood pressure check with her monitor and let us check it to make sure that they are correlating.  - We will decrease amlodipine to 5 mg daily due to edema.  - Increase Toprol XL to 50 mg daily.  - Low dose diuretic added to help with swelling.    3. Heart palpitations  - The patient will continue to take her medications.  - Monitor her caffeine intake.   - She will drink plenty of water, especially when mowing outside.    4. Overweight  - The patient will continue to work on weight loss.    Test results reviewed -   Echocardiogram: The overall function of the heart is normal. The ejection fraction is calculated at 56 to 60 percent. There is mild leakage at the mitral and tricuspid valve, but it was mild.    Holter monitor: There were a couple of premature beats that were seen. There were no pauses in the heart rate. The baseline rhythm is normal. The average heart rate is 87 bpm.    Follow-up: 6 months                  Transcribed from ambient dictation for CALI Allen by Naya Hernandez.  06/15/22   16:27 EDT    Patient verbalized consent to the visit recording.

## 2022-06-18 ENCOUNTER — APPOINTMENT (OUTPATIENT)
Dept: WOMENS IMAGING | Facility: HOSPITAL | Age: 62
End: 2022-06-18

## 2022-06-18 PROCEDURE — 77063 BREAST TOMOSYNTHESIS BI: CPT | Performed by: RADIOLOGY

## 2022-06-18 PROCEDURE — 77067 SCR MAMMO BI INCL CAD: CPT | Performed by: RADIOLOGY

## 2022-06-27 ENCOUNTER — CLINICAL SUPPORT (OUTPATIENT)
Dept: CARDIOLOGY | Facility: CLINIC | Age: 62
End: 2022-06-27

## 2022-06-27 ENCOUNTER — TELEPHONE (OUTPATIENT)
Dept: CARDIOLOGY | Facility: CLINIC | Age: 62
End: 2022-06-27

## 2022-06-27 VITALS — HEART RATE: 72 BPM | SYSTOLIC BLOOD PRESSURE: 140 MMHG | DIASTOLIC BLOOD PRESSURE: 76 MMHG

## 2022-06-27 DIAGNOSIS — Z01.30 BLOOD PRESSURE CHECK: Primary | ICD-10-CM

## 2022-06-27 NOTE — TELEPHONE ENCOUNTER
Blood pressure slightly higher than recent visit. If swelling improved continue current medication management but advise taking low dose HCTZ daily for BP management.

## 2022-06-27 NOTE — TELEPHONE ENCOUNTER
Patient came by office for BP check since starting HCTZ, decreasing amlodipine, and increasing metoprolol.     Manual BP:  140/76   HR 72    Patient's automatic cuff:  143/84  HR 71    Patient states that she has only taken HCTZ 12.5 mg 2 times, took for swelling and she states that it did help with swelling. Patient is also taking amlodipine 5 mg daily and metoprolol XL 25 mg BID.

## 2022-08-13 DIAGNOSIS — G89.29 CHRONIC CHEST PAIN: ICD-10-CM

## 2022-08-13 DIAGNOSIS — R07.9 CHRONIC CHEST PAIN: ICD-10-CM

## 2022-08-16 RX ORDER — DICYCLOMINE HYDROCHLORIDE 10 MG/1
CAPSULE ORAL
Qty: 60 CAPSULE | Refills: 3 | Status: SHIPPED | OUTPATIENT
Start: 2022-08-16 | End: 2023-01-11 | Stop reason: SDUPTHER

## 2022-08-22 DIAGNOSIS — I47.1 SVT (SUPRAVENTRICULAR TACHYCARDIA): ICD-10-CM

## 2022-08-22 DIAGNOSIS — R00.0 SINUS TACHYCARDIA: ICD-10-CM

## 2022-08-22 DIAGNOSIS — R00.2 PALPITATIONS: ICD-10-CM

## 2022-08-22 RX ORDER — METOPROLOL SUCCINATE 25 MG/1
25 TABLET, EXTENDED RELEASE ORAL 2 TIMES DAILY
Qty: 180 TABLET | Refills: 3 | Status: SHIPPED | OUTPATIENT
Start: 2022-08-22 | End: 2022-08-23

## 2022-08-22 NOTE — TELEPHONE ENCOUNTER
Patient states she is taking metoprolol XL 25 mg BID.  She is out as of today and requesting script be sent to [facility].  Script pended for approval.

## 2022-12-08 ENCOUNTER — TELEPHONE (OUTPATIENT)
Dept: CARDIOLOGY | Facility: CLINIC | Age: 62
End: 2022-12-08

## 2022-12-08 DIAGNOSIS — E78.00 HYPERCHOLESTEREMIA: ICD-10-CM

## 2022-12-08 DIAGNOSIS — R42 DIZZINESS: Primary | ICD-10-CM

## 2022-12-08 NOTE — TELEPHONE ENCOUNTER
Caller: Radha Villa    Relationship: Self    Best call back number: [phone]    What is the best time to reach you: ANY    Who are you requesting to speak with (clinical staff, provider,  specific staff member): ANY    What was the call regarding: PT HAD AN APPT W/ LAUREN @ [facility] FOR VERTIGO, [facility] IS CONCERNED THAT IT'S NOT VERTIGO AND THAT IT IS A CAROTID ARTERY ISSUE - [facility] WILL BE SENDING THEIR REPORT VIA FAX    PT HAS AN APPT 12.20.22, BUT SHE WOULD LIKE TO GET IN SOONER TO DISCUSS IF POSSIBLE    Do you require a callback: YES   [phone]

## 2022-12-08 NOTE — TELEPHONE ENCOUNTER
Patient would like to move appointment up from December 20,2022 . Would you like to order carotid US prior to visit ?

## 2022-12-14 ENCOUNTER — HOSPITAL ENCOUNTER (OUTPATIENT)
Dept: CARDIOLOGY | Facility: HOSPITAL | Age: 62
Discharge: HOME | End: 2022-12-14
Admitting: NURSE PRACTITIONER

## 2022-12-14 DIAGNOSIS — E78.00 HYPERCHOLESTEREMIA: ICD-10-CM

## 2022-12-14 DIAGNOSIS — R42 DIZZINESS: ICD-10-CM

## 2022-12-14 LAB
BH CV XLRA MEAS LEFT DIST CCA EDV: 19.6 CM/SEC
BH CV XLRA MEAS LEFT DIST CCA PSV: 77.8 CM/SEC
BH CV XLRA MEAS LEFT DIST ICA EDV: -27 CM/SEC
BH CV XLRA MEAS LEFT DIST ICA PSV: -83.2 CM/SEC
BH CV XLRA MEAS LEFT ICA/CCA RATIO: -1.16
BH CV XLRA MEAS LEFT MID ICA EDV: -29.4 CM/SEC
BH CV XLRA MEAS LEFT MID ICA PSV: -90 CM/SEC
BH CV XLRA MEAS LEFT PROX CCA EDV: 16.5 CM/SEC
BH CV XLRA MEAS LEFT PROX CCA PSV: 124.1 CM/SEC
BH CV XLRA MEAS LEFT PROX ECA EDV: -16 CM/SEC
BH CV XLRA MEAS LEFT PROX ECA PSV: -88.2 CM/SEC
BH CV XLRA MEAS LEFT PROX ICA EDV: -26.3 CM/SEC
BH CV XLRA MEAS LEFT PROX ICA PSV: -74.6 CM/SEC
BH CV XLRA MEAS LEFT VERTEBRAL A EDV: 13.7 CM/SEC
BH CV XLRA MEAS LEFT VERTEBRAL A PSV: 46.5 CM/SEC
BH CV XLRA MEAS RIGHT DIST CCA EDV: 23.7 CM/SEC
BH CV XLRA MEAS RIGHT DIST CCA PSV: 84.5 CM/SEC
BH CV XLRA MEAS RIGHT DIST ICA EDV: -31.5 CM/SEC
BH CV XLRA MEAS RIGHT DIST ICA PSV: -88.5 CM/SEC
BH CV XLRA MEAS RIGHT ICA/CCA RATIO: 1.37
BH CV XLRA MEAS RIGHT MID ICA EDV: 40.4 CM/SEC
BH CV XLRA MEAS RIGHT MID ICA PSV: 116 CM/SEC
BH CV XLRA MEAS RIGHT PROX CCA EDV: 22.3 CM/SEC
BH CV XLRA MEAS RIGHT PROX CCA PSV: 94.3 CM/SEC
BH CV XLRA MEAS RIGHT PROX ECA EDV: -14 CM/SEC
BH CV XLRA MEAS RIGHT PROX ECA PSV: -89.4 CM/SEC
BH CV XLRA MEAS RIGHT PROX ICA EDV: -23.7 CM/SEC
BH CV XLRA MEAS RIGHT PROX ICA PSV: -88.7 CM/SEC
BH CV XLRA MEAS RIGHT VERTEBRAL A EDV: -14.8 CM/SEC
BH CV XLRA MEAS RIGHT VERTEBRAL A PSV: -53.7 CM/SEC

## 2022-12-14 PROCEDURE — 93880 EXTRACRANIAL BILAT STUDY: CPT | Performed by: RADIOLOGY

## 2022-12-14 PROCEDURE — 93880 EXTRACRANIAL BILAT STUDY: CPT

## 2022-12-20 ENCOUNTER — OFFICE VISIT (OUTPATIENT)
Dept: CARDIOLOGY | Facility: CLINIC | Age: 62
End: 2022-12-20

## 2022-12-20 VITALS
DIASTOLIC BLOOD PRESSURE: 70 MMHG | HEIGHT: 65 IN | HEART RATE: 80 BPM | BODY MASS INDEX: 29.36 KG/M2 | SYSTOLIC BLOOD PRESSURE: 116 MMHG | WEIGHT: 176.2 LBS

## 2022-12-20 DIAGNOSIS — R00.2 PALPITATIONS: ICD-10-CM

## 2022-12-20 DIAGNOSIS — R42 DIZZINESS: ICD-10-CM

## 2022-12-20 DIAGNOSIS — R60.0 BILATERAL EDEMA OF LOWER EXTREMITY: ICD-10-CM

## 2022-12-20 DIAGNOSIS — I10 PRIMARY HYPERTENSION: ICD-10-CM

## 2022-12-20 DIAGNOSIS — E66.3 OVERWEIGHT: ICD-10-CM

## 2022-12-20 DIAGNOSIS — E78.00 HYPERCHOLESTEREMIA: Primary | ICD-10-CM

## 2022-12-20 PROCEDURE — 99214 OFFICE O/P EST MOD 30 MIN: CPT | Performed by: NURSE PRACTITIONER

## 2022-12-20 RX ORDER — AMLODIPINE BESYLATE 5 MG/1
TABLET ORAL
Qty: 90 TABLET | Refills: 2 | Status: SHIPPED | OUTPATIENT
Start: 2022-12-20

## 2022-12-20 RX ORDER — DICLOFENAC POTASSIUM 50 MG/1
50 TABLET, FILM COATED ORAL 2 TIMES DAILY
COMMUNITY

## 2022-12-20 RX ORDER — HYDROCHLOROTHIAZIDE 12.5 MG/1
12.5 TABLET ORAL DAILY
Qty: 90 TABLET | Refills: 2 | Status: SHIPPED | OUTPATIENT
Start: 2022-12-20

## 2022-12-20 NOTE — PROGRESS NOTES
Chief Complaint   Patient presents with   • Follow-up     Pt is here for cardiac follow up.  Pt states she has been having some dizziness.  She states her BP has been very elevated.  She denies CP, SOB or palpitations.  Pt does not take a daily ASA   • Med Refill     Pt request 90 day refills to be sent to [facility].  Medications were verified by med list.     • Lab Work     Pt states their last labs were a few months ago with her rheumatologist.         Cardiac Complaints  Dizziness      Subjective   Radha PEREZ Villa is a 62 y.o. female with hypertension, hypothyroidism, and hypercholesterolemia.  She was seen for evaluation in 2021 for  chest pain, dizziness, shortness of breath.  Dicyclomine was added for GI symptoms.  Nuclear stress test, echocardiogram, cardiac monitor, and overnight oxygen monitor were ordered.  Stress test revealed normal myocardial perfusion study, echocardiogram showed mild LVH with a EF of 56 to 60%.  Mild MR and TR noted.  RVSP was 33 mmHg. 14-day cardiac monitor showed baseline sinus rhythm with rare PAC and PVCs.  One 4 beat run V. tach noted.  There was 18 beat run of SVT.  No pauses recorded.  The patient admitted to chest pain and palpitations with no EKG changes seen.  Overnight oxygen monitor results did not show significant nocturnal desaturation.  Later cardiac clearance was given for GI work-up. She called in 2022 with more dizziness, carotid US advised that showed no stenosis bilaterally in the neck.    She comes today for follow up and denies any new concerns. She does have some dizziness, but thinks it is from her BP being elevated, mostly every morning and evening.  She states it often is 170/100 in PM. No CP, SOA, palpitations, or syncope noted. Labs done with rheumatology. Refills for 90 day supply requested.             Cardiac History  Past Surgical History:   Procedure Laterality Date   • APPENDECTOMY     • CARDIOVASCULAR STRESS TEST  04/11/2002    @ [facility].- 72%  THR. EF 71%. Negative   • CARDIOVASCULAR STRESS TEST  08/25/2014    Dr. Cantrell- 6 Min.30 Sec. 7.6 METS. 81% THR.EF 77%. Negative   • CARDIOVASCULAR STRESS TEST  12/22/2021    6 Min. 7.00 METS. 85% THR. BP- 153/85. EF > 70%. Negative   • CHOLECYSTECTOMY     • COLON SURGERY     • COLONOSCOPY     • COLONOSCOPY N/A 09/13/2016    Procedure: COLONOSCOPY FOR SCREENING CPT CODE: ;  Surgeon: Milagros Trevizo DO;  Location: [facility];  Service:    • CONVERTED (HISTORICAL) HOLTER  01/10/2022    14 Days. Avg 87. . One run of VT & SVT.   • DILATION AND CURETTAGE, DIAGNOSTIC / THERAPEUTIC     • ECHO - CONVERTED  12/22/2021    EF 60%. Mild MR. RVSP- 34 mmHg   • EYE SURGERY     • HYSTERECTOMY     • OTHER SURGICAL HISTORY  12/16/2021    Pulse O2- Normal   • US CAROTID UNILATERAL Bilateral 12/14/2022    No acute findings in the neck       Current Outpatient Medications   Medication Sig Dispense Refill   • amLODIPine (NORVASC) 5 MG tablet 1/2 tablet twice a day 90 tablet 2   • atorvastatin (LIPITOR) 10 MG tablet Take 10 mg by mouth Every Night.     • azelastine (ASTELIN) 0.1 % nasal spray 2 sprays into the nostril(s) as directed by provider 2 (Two) Times a Day. Use in each nostril as directed     • Cholecalciferol (Vitamin D3) 1.25 MG (30522 UT) tablet Take 1 tablet by mouth 1 (One) Time Per Week.     • diclofenac (CATAFLAM) 50 MG tablet Take 50 mg by mouth 2 (Two) Times a Day.     • dicyclomine (BENTYL) 10 MG capsule TAKE ONE CAPSULE BY MOUTH TWICE A DAY 60 capsule 3   • DULoxetine (CYMBALTA) 30 MG capsule Take 30 mg by mouth Daily.     • ELDERBERRY PO Take  by mouth As Needed.     • EPINEPHrine (EPIPEN) 0.3 MG/0.3ML solution auto-injector injection Inject 0.3 mg under the skin into the appropriate area as directed 1 (One) Time.     • famotidine (PEPCID) 20 MG tablet Take 20 mg by mouth 2 (Two) Times a Day.     • hydroCHLOROthiazide (HYDRODIURIL) 12.5 MG tablet Take 1 tablet by mouth Daily. 90 tablet 2   •  levocetirizine (XYZAL) 5 MG tablet Take 5 mg by mouth Every Evening.     • levothyroxine (SYNTHROID, LEVOTHROID) 50 MCG tablet Take 50 mcg by mouth Every Morning.     • metoprolol tartrate (LOPRESSOR) 25 MG tablet Take 1 tablet by mouth 2 (Two) Times a Day. 180 tablet 2   • montelukast (SINGULAIR) 10 MG tablet Take 10 mg by mouth Every Night.     • Multiple Vitamins-Minerals (EMERGEN-C IMMUNE PLUS PO) Take  by mouth As Needed.     • multivitamin with minerals tablet tablet Take 1 tablet by mouth Daily.     • nitroglycerin (NITROSTAT) 0.4 MG SL tablet Place 0.4 mg under the tongue Every 5 (Five) Minutes As Needed.       No current facility-administered medications for this visit.       Codeine and Lisinopril    Past Medical History:   Diagnosis Date   • Alpha-galactosidase A deficiency (HCC)    • Arthritis    • Borderline diabetic    • Cancer (HCC)     CARCINOID TUMOR   • Carcinoid tumor     ileum   • Diabetes mellitus (HCC)     BORDERLINE   • Hyperlipidemia    • Hypertension    • Hypothyroidism    • Hypothyroidism 12/14/2021   • PONV (postoperative nausea and vomiting)    • Regurgitation    • Tachycardia    • Vitamin D deficiency        Social History     Socioeconomic History   • Marital status:    Tobacco Use   • Smoking status: Never   • Smokeless tobacco: Never   Vaping Use   • Vaping Use: Never used   Substance and Sexual Activity   • Alcohol use: No   • Drug use: No   • Sexual activity: Defer       Family History   Problem Relation Age of Onset   • Heart attack Other    • Diabetes Other    • Stroke Other    • Cancer Other    • Heart disease Other         Cardiac disorder   • Heart disease Mother    • Atrial fibrillation Mother    • Skin cancer Mother    • Heart disease Maternal Grandfather    • Heart disease Paternal Grandmother        Review of Systems   Constitutional: Negative for malaise/fatigue and night sweats.   Cardiovascular: Negative for chest pain, claudication, dyspnea on exertion, irregular  "heartbeat, leg swelling, near-syncope, orthopnea, palpitations and syncope.   Respiratory: Negative for cough, shortness of breath and wheezing.    Musculoskeletal: Positive for stiffness. Negative for back pain and joint pain.   Gastrointestinal: Negative for anorexia, heartburn, nausea and vomiting.   Genitourinary: Negative for dysuria, hematuria, hesitancy and nocturia.   Neurological: Positive for dizziness and light-headedness.   Psychiatric/Behavioral: Negative for depression and memory loss. The patient is not nervous/anxious.            Objective     /70 (BP Location: Right arm, Patient Position: Sitting)   Pulse 80   Ht 165.1 cm (65\")   Wt 79.9 kg (176 lb 3.2 oz)   BMI 29.32 kg/m²     Constitutional:       Appearance: Not in distress.   Eyes:      Pupils: Pupils are equal, round, and reactive to light.   HENT:      Nose: Nose normal.   Pulmonary:      Effort: Pulmonary effort is normal.      Breath sounds: Normal breath sounds.   Cardiovascular:      PMI at left midclavicular line. Normal rate. Regular rhythm.      Murmurs: There is a systolic murmur.   Abdominal:      Palpations: Abdomen is soft.   Musculoskeletal: Normal range of motion.      Cervical back: Normal range of motion and neck supple. Skin:     General: Skin is warm and dry.   Neurological:      Mental Status: Alert.         Procedures         Diagnoses and all orders for this visit:    1. Hypercholesteremia (Primary)    2. Primary hypertension  -     amLODIPine (NORVASC) 5 MG tablet; 1/2 tablet twice a day  Dispense: 90 tablet; Refill: 2    3. Bilateral edema of lower extremity  -     hydroCHLOROthiazide (HYDRODIURIL) 12.5 MG tablet; Take 1 tablet by mouth Daily.  Dispense: 90 tablet; Refill: 2    4. Palpitations    5. Dizziness    6. Overweight    Other orders  -     metoprolol tartrate (LOPRESSOR) 25 MG tablet; Take 1 tablet by mouth 2 (Two) Times a Day.  Dispense: 180 tablet; Refill: 2             HTN: BP is at goal today. She " does admit to some elevation at home, noted on her log. Patient will be urged to change norvasc to 1/2 tablet BID. If BP still not at goal with the change, we will increase HCTZ to 25mg daily. Will continue same lopressor therapy. Limited sodium urged.     Palpitations: Managed with lopressor therapy, same continued. Limited caffeine urged along with adequate fluid intake.     Hyperlipidemia: On statin therapy with lipitor. She tolerates well. FLP with your office. Same continued. Can we have for review? Limited carb diet urged.     Dizziness: Noted today, but no worse than prior. Syncope denied. Most recent carotid US discussed with patient with no stenosis noted to either carotid artery.    Cardiac status: Stable. No new/worsening concerns voiced. No repeat workup recommended.    Refills per request.     BMI noted at 29.32, good cardiac diet with walking regimen urged.     6 month follow up recommended, or sooner if needed.        Problems Addressed this Visit        Cardiac and Vasculature    Palpitations    Primary hypertension    Relevant Medications    amLODIPine (NORVASC) 5 MG tablet    hydroCHLOROthiazide (HYDRODIURIL) 12.5 MG tablet    metoprolol tartrate (LOPRESSOR) 25 MG tablet    Hypercholesteremia - Primary       Symptoms and Signs    Bilateral edema of lower extremity    Relevant Medications    hydroCHLOROthiazide (HYDRODIURIL) 12.5 MG tablet    Dizziness   Other Visit Diagnoses     Overweight          Diagnoses       Codes Comments    Hypercholesteremia    -  Primary ICD-10-CM: E78.00  ICD-9-CM: 272.0     Primary hypertension     ICD-10-CM: I10  ICD-9-CM: 401.9     Bilateral edema of lower extremity     ICD-10-CM: R60.0  ICD-9-CM: 782.3     Palpitations     ICD-10-CM: R00.2  ICD-9-CM: 785.1     Dizziness     ICD-10-CM: R42  ICD-9-CM: 780.4     Overweight     ICD-10-CM: E66.3  ICD-9-CM: 278.02                   Electronically signed by Cassandra Spencer, CALI December 20, 2022 10:13 EST

## 2023-01-11 DIAGNOSIS — R07.9 CHRONIC CHEST PAIN: ICD-10-CM

## 2023-01-11 DIAGNOSIS — G89.29 CHRONIC CHEST PAIN: ICD-10-CM

## 2023-01-11 RX ORDER — DICYCLOMINE HYDROCHLORIDE 10 MG/1
10 CAPSULE ORAL 2 TIMES DAILY
Qty: 60 CAPSULE | Refills: 3 | Status: SHIPPED | OUTPATIENT
Start: 2023-01-11

## 2023-08-14 ENCOUNTER — TELEPHONE (OUTPATIENT)
Dept: FAMILY MEDICINE CLINIC | Facility: CLINIC | Age: 63
End: 2023-08-14
Payer: MEDICARE

## 2023-08-14 DIAGNOSIS — D3A.00 CARCINOID TUMOR, UNSPECIFIED SITE, UNSPECIFIED WHETHER MALIGNANT: Primary | ICD-10-CM

## 2023-08-14 NOTE — TELEPHONE ENCOUNTER
Patient is calling in requesting a  referral to Dr. Jadon London at [facility], patient has previously been seen here. Dr. Ravi is sending her paperwork to our office.

## 2023-08-15 NOTE — TELEPHONE ENCOUNTER
Thinks carcinoid tumor is acting up again showing signs of progression Dr. Ravi was wanting patient to see them for higher level of care.

## 2023-08-31 ENCOUNTER — OFFICE VISIT (OUTPATIENT)
Dept: FAMILY MEDICINE CLINIC | Facility: CLINIC | Age: 63
End: 2023-08-31
Payer: MEDICARE

## 2023-08-31 VITALS
SYSTOLIC BLOOD PRESSURE: 114 MMHG | HEART RATE: 86 BPM | OXYGEN SATURATION: 99 % | RESPIRATION RATE: 18 BRPM | TEMPERATURE: 97.6 F | BODY MASS INDEX: 28.66 KG/M2 | WEIGHT: 172 LBS | DIASTOLIC BLOOD PRESSURE: 76 MMHG | HEIGHT: 65 IN

## 2023-08-31 DIAGNOSIS — H01.005 BLEPHARITIS OF LEFT LOWER EYELID, UNSPECIFIED TYPE: Primary | ICD-10-CM

## 2023-08-31 PROCEDURE — 3044F HG A1C LEVEL LT 7.0%: CPT | Performed by: NURSE PRACTITIONER

## 2023-08-31 PROCEDURE — 99213 OFFICE O/P EST LOW 20 MIN: CPT | Performed by: NURSE PRACTITIONER

## 2023-08-31 PROCEDURE — 1159F MED LIST DOCD IN RCRD: CPT | Performed by: NURSE PRACTITIONER

## 2023-08-31 PROCEDURE — 1160F RVW MEDS BY RX/DR IN RCRD: CPT | Performed by: NURSE PRACTITIONER

## 2023-08-31 PROCEDURE — 3074F SYST BP LT 130 MM HG: CPT | Performed by: NURSE PRACTITIONER

## 2023-08-31 PROCEDURE — 3078F DIAST BP <80 MM HG: CPT | Performed by: NURSE PRACTITIONER

## 2023-08-31 NOTE — PROGRESS NOTES
"Chief Complaint  Blepharitis (Left Eye upper lid Swelling since yesterday./Right eye starting to get itchy./)    Subjective        Radha Villa presents to [facility] for acute care (swelling left upper and lower eye lids).    Eye Problem   The left eye is affected. This is a new problem. The current episode started yesterday. The problem occurs constantly. The problem has been gradually worsening. There was no injury mechanism. The pain is mild. There is No known exposure to pink eye. She Does not wear contacts. Associated symptoms include eye redness and itching. Pertinent negatives include no blurred vision, eye discharge, double vision, fever, foreign body sensation, nausea, photophobia, recent URI or vomiting. Treatments tried: \"washed eye and dry eye solution\"     Objective   Vital Signs:  /76 (BP Location: Left arm, Patient Position: Sitting, Cuff Size: Adult)   Pulse 86   Temp 97.6 øF (36.4 øC) (Temporal)   Resp 18   Ht 165.1 cm (65\")   Wt 78 kg (172 lb)   SpO2 99%   BMI 28.62 kg/mý   Estimated body mass index is 28.62 kg/mý as calculated from the following:    Height as of this encounter: 165.1 cm (65\").    Weight as of this encounter: 78 kg (172 lb).       Physical Exam  Vitals and nursing note reviewed.   Constitutional:       General: She is awake.      Appearance: Normal appearance.   HENT:      Head: Normocephalic.      Right Ear: Hearing and external ear normal.      Left Ear: Hearing and external ear normal.      Nose: Nose normal.      Mouth/Throat:      Lips: Pink.      Mouth: Mucous membranes are moist.   Eyes:      General: Lids are normal.      Conjunctiva/sclera: Conjunctivae normal.      Pupils: Pupils are equal, round, and reactive to light.        Comments: 1. Erythema/edema noted to left lower eye lid  2. Erythema/edema noted to left upper eye lid   Cardiovascular:      Rate and Rhythm: Normal rate.   Pulmonary:      Effort: Pulmonary effort is " normal.   Abdominal:      General: Abdomen is protuberant.   Musculoskeletal:         General: Normal range of motion.      Cervical back: Normal range of motion.   Skin:     General: Skin is warm and dry.      Capillary Refill: Capillary refill takes less than 2 seconds.   Neurological:      Mental Status: She is alert and oriented to person, place, and time.      Sensory: Sensation is intact.      Motor: Motor function is intact.      Coordination: Coordination is intact.      Gait: Gait is intact.   Psychiatric:         Attention and Perception: Attention and perception normal.         Mood and Affect: Affect normal. Mood is anxious.         Speech: Speech normal.         Behavior: Behavior normal. Behavior is cooperative.         Thought Content: Thought content normal.         Cognition and Memory: Cognition normal.         Judgment: Judgment normal.        Result Review :  The following data was reviewed by: CALI Lake on 08/31/2023:    Assessment and Plan   Diagnoses and all orders for this visit:    1. Blepharitis of left lower eyelid, unspecified type (Primary)  Comments:  avoid eye makeup; gentle eye lid scrub with tear-free baby wash; warm compresses  Orders:  -     tobramycin-dexAMETHasone (TobraDex) 0.3-0.1 % ophthalmic ointment; Administer  into the left eye 3 (Three) Times a Day.  Dispense: 3.5 g; Refill: 0         I spent 20 minutes caring for Radha on this date of service. This time includes time spent by me in the following activities:preparing for the visit, reviewing tests, obtaining and/or reviewing a separately obtained history, performing a medically appropriate examination and/or evaluation , counseling and educating the patient/family/caregiver, ordering medications, tests, or procedures, documenting information in the medical record, and independently interpreting results and communicating that information with the patient/family/caregiver    Follow Up   Return if symptoms worsen or  fail to improve.  Patient was given instructions and counseling regarding her condition or for health maintenance advice. Please see specific information pulled into the AVS if appropriate.       This document has been electronically signed by CALI Lake  September 1, 2023 06:06 EDT

## 2023-09-28 ENCOUNTER — OFFICE VISIT (OUTPATIENT)
Dept: FAMILY MEDICINE CLINIC | Facility: CLINIC | Age: 63
End: 2023-09-28
Payer: MEDICARE

## 2023-09-28 VITALS
OXYGEN SATURATION: 98 % | TEMPERATURE: 97.1 F | SYSTOLIC BLOOD PRESSURE: 132 MMHG | WEIGHT: 172.2 LBS | HEIGHT: 65 IN | RESPIRATION RATE: 20 BRPM | BODY MASS INDEX: 28.69 KG/M2 | HEART RATE: 67 BPM | DIASTOLIC BLOOD PRESSURE: 76 MMHG

## 2023-09-28 DIAGNOSIS — H66.93 BILATERAL OTITIS MEDIA, UNSPECIFIED OTITIS MEDIA TYPE: Primary | ICD-10-CM

## 2023-09-28 DIAGNOSIS — J02.9 SORE THROAT: ICD-10-CM

## 2023-09-28 LAB
EXPIRATION DATE: NORMAL
INTERNAL CONTROL: NORMAL
Lab: NORMAL
S PYO AG THROAT QL: NEGATIVE

## 2023-09-28 RX ORDER — FLUCONAZOLE 150 MG/1
150 TABLET ORAL ONCE
Qty: 1 TABLET | Refills: 0 | Status: SHIPPED | OUTPATIENT
Start: 2023-09-28 | End: 2023-09-28

## 2023-09-28 RX ORDER — SUCRALFATE 1 G/1
TABLET ORAL
COMMUNITY

## 2023-09-28 RX ORDER — AMOXICILLIN AND CLAVULANATE POTASSIUM 875; 125 MG/1; MG/1
1 TABLET, FILM COATED ORAL 2 TIMES DAILY
Qty: 20 TABLET | Refills: 0 | Status: SHIPPED | OUTPATIENT
Start: 2023-09-28 | End: 2023-10-08

## 2023-09-28 RX ORDER — HYDROCODONE/ACETAMINOPHEN 5 MG-500MG
18 TABLET ORAL
COMMUNITY

## 2023-09-28 RX ORDER — ESTRADIOL 0.1 MG/G
CREAM VAGINAL
COMMUNITY
Start: 2023-09-13

## 2023-09-28 RX ORDER — DEXAMETHASONE SODIUM PHOSPHATE 4 MG/ML
4 INJECTION, SOLUTION INTRA-ARTICULAR; INTRALESIONAL; INTRAMUSCULAR; INTRAVENOUS; SOFT TISSUE ONCE
Status: COMPLETED | OUTPATIENT
Start: 2023-09-28 | End: 2023-09-28

## 2023-09-28 RX ORDER — METHYLPREDNISOLONE 4 MG/1
TABLET ORAL
Qty: 21 EACH | Refills: 0 | Status: SHIPPED | OUTPATIENT
Start: 2023-09-28

## 2023-09-28 RX ADMIN — DEXAMETHASONE SODIUM PHOSPHATE 4 MG: 4 INJECTION, SOLUTION INTRA-ARTICULAR; INTRALESIONAL; INTRAMUSCULAR; INTRAVENOUS; SOFT TISSUE at 16:34

## 2023-09-28 NOTE — PROGRESS NOTES
"Chief Complaint  Scratchy throat (X 2 days, runny nose and eyes, cough (productive);/Grandchild positive for Strep)    Subjective        Radha Villa presents to [facility] for acute care (sore throat; cough).    Cough  This is a new problem. Episode onset: 2 days ago. The problem has been gradually improving. The cough is Productive of purulent sputum. Associated symptoms include chills, ear congestion, ear pain, headaches, myalgias, nasal congestion, postnasal drip, rhinorrhea and a sore throat. Pertinent negatives include no chest pain, fever, heartburn, hemoptysis, rash, shortness of breath, sweats, weight loss or wheezing.   Sore Throat   This is a new problem. Episode onset: 2 days ago. The problem has been gradually improving. There has been no fever. Associated symptoms include congestion, coughing, ear pain, headaches and a plugged ear sensation. Pertinent negatives include no abdominal pain, diarrhea, drooling, ear discharge, hoarse voice, neck pain, shortness of breath, stridor, swollen glands, trouble swallowing or vomiting.     Objective   Vital Signs:  /76 (BP Location: Right arm, Patient Position: Sitting, Cuff Size: Adult)   Pulse 67   Temp 97.1 °F (36.2 °C) (Temporal)   Resp 20   Ht 165.1 cm (65\")   Wt 78.1 kg (172 lb 3.2 oz)   SpO2 98%   BMI 28.66 kg/m²   Estimated body mass index is 28.66 kg/m² as calculated from the following:    Height as of this encounter: 165.1 cm (65\").    Weight as of this encounter: 78.1 kg (172 lb 3.2 oz).       Physical Exam  Vitals and nursing note reviewed.   Constitutional:       General: She is awake.      Appearance: Normal appearance.   HENT:      Head: Normocephalic.      Right Ear: Hearing and external ear normal. Swelling and tenderness present. A middle ear effusion is present.      Left Ear: Hearing and external ear normal. Swelling and tenderness present. A middle ear effusion is present.      Nose: Nose normal. Nasal " tenderness and congestion present.      Mouth/Throat:      Lips: Pink.      Mouth: Mucous membranes are moist.      Pharynx: Pharyngeal swelling and posterior oropharyngeal erythema present.   Eyes:      General: Lids are normal.      Conjunctiva/sclera: Conjunctivae normal.      Pupils: Pupils are equal, round, and reactive to light.   Cardiovascular:      Rate and Rhythm: Normal rate and regular rhythm.      Heart sounds: Normal heart sounds.   Pulmonary:      Effort: Pulmonary effort is normal.      Breath sounds: Normal breath sounds.   Abdominal:      General: Abdomen is protuberant. Bowel sounds are normal.      Palpations: Abdomen is soft.      Tenderness: There is no abdominal tenderness.   Musculoskeletal:         General: Normal range of motion.      Cervical back: Normal range of motion and neck supple.   Lymphadenopathy:      Cervical: Cervical adenopathy present.   Skin:     General: Skin is warm and dry.      Capillary Refill: Capillary refill takes less than 2 seconds.   Neurological:      Mental Status: She is alert and oriented to person, place, and time.      Sensory: Sensation is intact.      Motor: Motor function is intact.      Coordination: Coordination is intact.      Gait: Gait is intact.   Psychiatric:         Attention and Perception: Attention and perception normal.         Mood and Affect: Mood and affect normal.         Speech: Speech normal.         Behavior: Behavior normal. Behavior is cooperative.         Thought Content: Thought content normal.         Cognition and Memory: Cognition normal.         Judgment: Judgment normal.        Result Review :  The following data was reviewed by: CALI Lake on 09/28/2023:  Strep          9/28/2023    16:01   [facility]   POC Strep A, Molecular Negative      Assessment and Plan   Diagnoses and all orders for this visit:    1. Bilateral otitis media, unspecified otitis media type (Primary)  -     amoxicillin-clavulanate (AUGMENTIN)  875-125 MG per tablet; Take 1 tablet by mouth 2 (Two) Times a Day for 10 days.  Dispense: 20 tablet; Refill: 0  -     fluconazole (Diflucan) 150 MG tablet; Take 1 tablet by mouth 1 (One) Time for 1 dose.  Dispense: 1 tablet; Refill: 0  -     dexAMETHasone (DECADRON) injection 4 mg  -     methylPREDNISolone (MEDROL) 4 MG dose pack; Take as directed on package instructions.  Dispense: 21 each; Refill: 0    2. Sore throat  -     POCT rapid strep A         I spent 20 minutes caring for Radha on this date of service. This time includes time spent by me in the following activities:preparing for the visit, reviewing tests, obtaining and/or reviewing a separately obtained history, performing a medically appropriate examination and/or evaluation , counseling and educating the patient/family/caregiver, ordering medications, tests, or procedures, referring and communicating with other health care professionals , documenting information in the medical record, independently interpreting results and communicating that information with the patient/family/caregiver, and care coordination    Follow Up   Return if symptoms worsen or fail to improve.  Patient was given instructions and counseling regarding her condition or for health maintenance advice. Please see specific information pulled into the AVS if appropriate.       This document has been electronically signed by CALI Lake  September 29, 2023 06:47 EDT

## 2023-10-30 ENCOUNTER — CLINICAL SUPPORT (OUTPATIENT)
Dept: FAMILY MEDICINE CLINIC | Facility: CLINIC | Age: 63
End: 2023-10-30
Payer: MEDICARE

## 2023-10-30 VITALS — TEMPERATURE: 97.3 F

## 2023-10-30 DIAGNOSIS — Z23 NEED FOR VACCINATION: Primary | ICD-10-CM

## 2023-10-30 PROCEDURE — 90662 IIV NO PRSV INCREASED AG IM: CPT | Performed by: NURSE PRACTITIONER

## 2023-10-30 PROCEDURE — G0008 ADMIN INFLUENZA VIRUS VAC: HCPCS | Performed by: NURSE PRACTITIONER

## 2023-10-30 NOTE — PROGRESS NOTES
Radha Villa presents to [facility] primary care for Influenza Vaccine today.  Patient has vitals today of   Vitals:    10/30/23 1259   Temp: 97.3 °F (36.3 °C)        Patient was told to return to clinic by Yahaira Brock APRN and to receive immunization. Flu consent was reviewed with  and consent was signed by Radha Villa .     Patient tolerated vaccination well with no immediate signs or symptoms of allergic reaction or intolerance.        Lisette Barker RN

## 2023-11-06 RX ORDER — LEVOTHYROXINE SODIUM 0.05 MG/1
50 TABLET ORAL
Qty: 30 TABLET | Refills: 1 | Status: SHIPPED | OUTPATIENT
Start: 2023-11-06

## 2023-11-06 NOTE — TELEPHONE ENCOUNTER
Caller: Radha Villa    Relationship: Self    Best call back number: [phone]     Requested Prescriptions:   Requested Prescriptions     Pending Prescriptions Disp Refills    levothyroxine (SYNTHROID, LEVOTHROID) 50 MCG tablet       Sig: Take 1 tablet by mouth Every Morning.        Pharmacy where request should be sent: [facility] [address] [phone] [facility] [phone] FX     Last office visit with prescribing clinician: 9/28/2023   Last telemedicine visit with prescribing clinician: Visit date not found   Next office visit with prescribing clinician: 1/8/2024     Additional details provided by patient:     Does the patient have less than a 3 day supply:  [] Yes  [x] No    Would you like a call back once the refill request has been completed: [] Yes [x] No    If the office needs to give you a call back, can they leave a voicemail: [] Yes [x] No    Erika Wesley   11/06/23 10:51 EST

## 2023-12-18 ENCOUNTER — OFFICE VISIT (OUTPATIENT)
Dept: FAMILY MEDICINE CLINIC | Facility: CLINIC | Age: 63
End: 2023-12-18
Payer: MEDICARE

## 2023-12-18 VITALS
BODY MASS INDEX: 28.32 KG/M2 | HEIGHT: 65 IN | DIASTOLIC BLOOD PRESSURE: 66 MMHG | SYSTOLIC BLOOD PRESSURE: 122 MMHG | TEMPERATURE: 96.8 F | OXYGEN SATURATION: 99 % | HEART RATE: 72 BPM | WEIGHT: 170 LBS | RESPIRATION RATE: 20 BRPM

## 2023-12-18 DIAGNOSIS — J01.90 ACUTE NON-RECURRENT SINUSITIS, UNSPECIFIED LOCATION: Primary | ICD-10-CM

## 2023-12-18 DIAGNOSIS — R05.1 ACUTE COUGH: ICD-10-CM

## 2023-12-18 DIAGNOSIS — J02.9 SORE THROAT: ICD-10-CM

## 2023-12-18 DIAGNOSIS — H66.93 BILATERAL OTITIS MEDIA, UNSPECIFIED OTITIS MEDIA TYPE: ICD-10-CM

## 2023-12-18 LAB
EXPIRATION DATE: NORMAL
EXPIRATION DATE: NORMAL
FLUAV AG UPPER RESP QL IA.RAPID: NOT DETECTED
FLUBV AG UPPER RESP QL IA.RAPID: NOT DETECTED
INTERNAL CONTROL: NORMAL
INTERNAL CONTROL: NORMAL
Lab: NORMAL
Lab: NORMAL
S PYO AG THROAT QL: NEGATIVE
SARS-COV-2 AG UPPER RESP QL IA.RAPID: NOT DETECTED

## 2023-12-18 RX ORDER — CALCIPOTRIENE 50 UG/G
CREAM TOPICAL AS NEEDED
COMMUNITY
Start: 2023-11-29

## 2023-12-18 RX ORDER — FLUCONAZOLE 150 MG/1
150 TABLET ORAL ONCE
Qty: 1 TABLET | Refills: 0 | Status: SHIPPED | OUTPATIENT
Start: 2023-12-18 | End: 2023-12-18

## 2023-12-18 RX ORDER — DEXAMETHASONE SODIUM PHOSPHATE 4 MG/ML
8 INJECTION, SOLUTION INTRA-ARTICULAR; INTRALESIONAL; INTRAMUSCULAR; INTRAVENOUS; SOFT TISSUE ONCE
Status: COMPLETED | OUTPATIENT
Start: 2023-12-18 | End: 2023-12-18

## 2023-12-18 RX ORDER — CEFDINIR 300 MG/1
300 CAPSULE ORAL 2 TIMES DAILY
Qty: 20 CAPSULE | Refills: 0 | Status: SHIPPED | OUTPATIENT
Start: 2023-12-18

## 2023-12-18 RX ORDER — VALACYCLOVIR HYDROCHLORIDE 1 G/1
1000 TABLET, FILM COATED ORAL AS NEEDED
COMMUNITY
Start: 2023-11-29

## 2023-12-18 RX ORDER — ALBUTEROL SULFATE 2.5 MG/3ML
2.5 SOLUTION RESPIRATORY (INHALATION) AS NEEDED
COMMUNITY
Start: 2023-11-18

## 2023-12-18 RX ORDER — METHYLPREDNISOLONE 4 MG/1
TABLET ORAL
Qty: 21 EACH | Refills: 0 | Status: SHIPPED | OUTPATIENT
Start: 2023-12-18

## 2023-12-18 RX ADMIN — DEXAMETHASONE SODIUM PHOSPHATE 8 MG: 4 INJECTION, SOLUTION INTRA-ARTICULAR; INTRALESIONAL; INTRAMUSCULAR; INTRAVENOUS; SOFT TISSUE at 12:26

## 2023-12-18 NOTE — PROGRESS NOTES
"Chief Complaint  Cough (Before Thanksgiving but states has got worse; exposed to Covid in family and at [employer] this past week; states has also had some sore throat in past)    Subjective        Radha Villa presents to [facility] for acute care (cough).    Cough  This is a recurrent problem. The current episode started more than 1 month ago. The problem has been gradually worsening. The problem occurs every few minutes. The cough is Productive of purulent sputum. Associated symptoms include chest pain, ear congestion, ear pain, headaches, myalgias, nasal congestion, postnasal drip, rhinorrhea, a sore throat and shortness of breath. Pertinent negatives include no chills, fever, heartburn, hemoptysis, rash, sweats, weight loss or wheezing. The symptoms are aggravated by pollens. Treatments tried: azithromycin. Improvement on treatment: \"Got better for a bit, but has since gotten significantly worse.\" Her past medical history is significant for environmental allergies.   Sinus Problem  This is a new problem. The current episode started 1 to 4 weeks ago. The problem has been gradually worsening since onset. There has been no fever. The pain is moderate. Associated symptoms include congestion, coughing, ear pain, headaches, a hoarse voice, neck pain, shortness of breath, sinus pressure, sneezing and a sore throat. Pertinent negatives include no chills, diaphoresis or swollen glands. Past treatments include antibiotics (azithromycin). Improvement on treatment: \"Got better for a bit, but has since gotten significantly worse.\"     Objective   Vital Signs:  /66 (BP Location: Left arm, Patient Position: Sitting, Cuff Size: Adult)   Pulse 72   Temp 96.8 °F (36 °C) (Temporal)   Resp 20   Ht 165.1 cm (65\")   Wt 77.1 kg (170 lb)   SpO2 99%   BMI 28.29 kg/m²   Estimated body mass index is 28.29 kg/m² as calculated from the following:    Height as of this encounter: 165.1 cm (65\").    " Weight as of this encounter: 77.1 kg (170 lb).       Physical Exam  Vitals and nursing note reviewed.   Constitutional:       General: She is awake.      Appearance: Normal appearance.   HENT:      Head: Normocephalic.      Right Ear: Hearing and external ear normal. Swelling and tenderness present. A middle ear effusion is present.      Left Ear: Hearing and external ear normal. Swelling and tenderness present. A middle ear effusion is present.      Nose: Nasal tenderness and congestion present.      Right Sinus: Maxillary sinus tenderness and frontal sinus tenderness present.      Left Sinus: Maxillary sinus tenderness and frontal sinus tenderness present.      Mouth/Throat:      Lips: Pink.      Mouth: Mucous membranes are moist.      Pharynx: Pharyngeal swelling and posterior oropharyngeal erythema present.   Eyes:      General: Lids are normal.      Conjunctiva/sclera: Conjunctivae normal.      Pupils: Pupils are equal, round, and reactive to light.   Cardiovascular:      Rate and Rhythm: Normal rate and regular rhythm.      Heart sounds: Normal heart sounds.   Pulmonary:      Effort: Pulmonary effort is normal.      Breath sounds: Normal breath sounds.   Abdominal:      General: Abdomen is protuberant. Bowel sounds are normal.      Palpations: Abdomen is soft.      Tenderness: There is no abdominal tenderness.   Musculoskeletal:         General: Normal range of motion.      Cervical back: Normal range of motion and neck supple.   Lymphadenopathy:      Cervical: Cervical adenopathy present.   Skin:     General: Skin is warm and dry.      Capillary Refill: Capillary refill takes less than 2 seconds.   Neurological:      Mental Status: She is alert and oriented to person, place, and time.      Sensory: Sensation is intact.      Motor: Motor function is intact.      Coordination: Coordination is intact.      Gait: Gait is intact.   Psychiatric:         Attention and Perception: Attention and perception normal.          Mood and Affect: Affect normal. Mood is anxious.         Speech: Speech normal.         Behavior: Behavior normal. Behavior is cooperative.         Thought Content: Thought content normal.         Cognition and Memory: Cognition normal.         Judgment: Judgment normal.        Result Review :  The following data was reviewed by: CALI Lake on 12/18/2023:  Strep          12/18/2023    12:15   [facility]   POC Strep A, Molecular Negative        SARS FLU A / B Point of Care test.    Influenza A - Negative (-)  Influenza B - Negative (-)  SARS COVID Ag - Negative (-)     Assessment and Plan   Diagnoses and all orders for this visit:    1. Acute non-recurrent sinusitis, unspecified location (Primary)  -     cefdinir (OMNICEF) 300 MG capsule; Take 1 capsule by mouth 2 (Two) Times a Day.  Dispense: 20 capsule; Refill: 0  -     fluconazole (Diflucan) 150 MG tablet; Take 1 tablet by mouth 1 (One) Time for 1 dose.  Dispense: 1 tablet; Refill: 0  -     dexAMETHasone (DECADRON) injection 8 mg    2. Bilateral otitis media, unspecified otitis media type  -     methylPREDNISolone (MEDROL) 4 MG dose pack; Take as directed on package instructions.  Dispense: 21 each; Refill: 0    3. Acute cough  -     POCT SARS-CoV-2 Antigen ZACHARIAH + Flu    4. Sore throat  -     POCT rapid strep A         I spent 20 minutes caring for Radha on this date of service. This time includes time spent by me in the following activities:preparing for the visit, reviewing tests, obtaining and/or reviewing a separately obtained history, performing a medically appropriate examination and/or evaluation , counseling and educating the patient/family/caregiver, ordering medications, tests, or procedures, referring and communicating with other health care professionals , documenting information in the medical record, independently interpreting results and communicating that information with the patient/family/caregiver, and care coordination    Follow  Up   Return if symptoms worsen or fail to improve.  Patient was given instructions and counseling regarding her condition or for health maintenance advice. Please see specific information pulled into the AVS if appropriate.       This document has been electronically signed by CALI Lake  December 18, 2023 13:12 EST

## 2023-12-28 RX ORDER — OMEPRAZOLE 20 MG/1
20 CAPSULE, DELAYED RELEASE ORAL DAILY
Qty: 30 CAPSULE | Refills: 0 | Status: SHIPPED | OUTPATIENT
Start: 2023-12-28

## 2023-12-28 NOTE — TELEPHONE ENCOUNTER
Caller: Radha Villa    Relationship: Self    Best call back number: [phone]    Requested Prescriptions:   Requested Prescriptions     Pending Prescriptions Disp Refills    omeprazole (priLOSEC) 20 MG capsule       Sig: Take 1 capsule by mouth Daily.        Pharmacy where request should be sent: [facility] - [address] - [phone]  - [phone] FX     Last office visit with prescribing clinician: 12/18/2023   Last telemedicine visit with prescribing clinician: Visit date not found   Next office visit with prescribing clinician: 1/8/2024     Additional details provided by patient:     Does the patient have less than a 3 day supply:  [x] Yes  [] No    SiMaikel Vázquez Rep   12/28/23 15:45 EST

## 2024-01-08 ENCOUNTER — OFFICE VISIT (OUTPATIENT)
Dept: FAMILY MEDICINE CLINIC | Facility: CLINIC | Age: 64
End: 2024-01-08
Payer: MEDICARE

## 2024-01-08 VITALS
HEART RATE: 81 BPM | OXYGEN SATURATION: 97 % | RESPIRATION RATE: 18 BRPM | DIASTOLIC BLOOD PRESSURE: 84 MMHG | BODY MASS INDEX: 28.32 KG/M2 | SYSTOLIC BLOOD PRESSURE: 122 MMHG | TEMPERATURE: 96.6 F | WEIGHT: 170 LBS | HEIGHT: 65 IN

## 2024-01-08 DIAGNOSIS — R53.82 CHRONIC FATIGUE: ICD-10-CM

## 2024-01-08 DIAGNOSIS — E03.9 HYPOTHYROIDISM, UNSPECIFIED TYPE: ICD-10-CM

## 2024-01-08 DIAGNOSIS — I10 PRIMARY HYPERTENSION: ICD-10-CM

## 2024-01-08 DIAGNOSIS — R41.3 MEMORY DEFICIT: ICD-10-CM

## 2024-01-08 DIAGNOSIS — Z00.00 ENCOUNTER FOR SUBSEQUENT ANNUAL WELLNESS VISIT (AWV) IN MEDICARE PATIENT: Primary | ICD-10-CM

## 2024-01-08 DIAGNOSIS — R73.9 HYPERGLYCEMIA: ICD-10-CM

## 2024-01-08 DIAGNOSIS — E55.9 VITAMIN D DEFICIENCY: ICD-10-CM

## 2024-01-08 LAB
EXPIRATION DATE: ABNORMAL
HBA1C MFR BLD: 5.9 % (ref 4.5–5.7)
Lab: ABNORMAL

## 2024-01-08 RX ORDER — HYDROCORTISONE 25 MG/ML
1 LOTION TOPICAL AS NEEDED
COMMUNITY

## 2024-01-08 RX ORDER — MUPIROCIN CALCIUM 20 MG/G
1 CREAM TOPICAL AS NEEDED
COMMUNITY

## 2024-01-08 RX ORDER — ACYCLOVIR 50 MG/G
1 CREAM TOPICAL AS NEEDED
COMMUNITY

## 2024-01-08 NOTE — PROGRESS NOTES
"The ABCs of the Annual Wellness Visit  Subsequent Medicare Wellness Visit    Subjective    {Wrapup  Review (Popup)  Advance Care Planning  Labs  CC  Problem List  Visit Diagnosis  Medications  Result Review  Imaging  [facility]  [facility]  [facility]  [employer]  Encounters  Notes  Media  Procedures :23}  Radha Villa is a 63 y.o. female who presents for a Subsequent Medicare Wellness Visit.    The following portions of the patient's history were reviewed and   updated as appropriate: {history reviewed:20406::\"allergies\",\"current medications\",\"past family history\",\"past medical history\",\"past social history\",\"past surgical history\",\"problem list\"}.    Compared to one year ago, the patient feels her physical   health is {better worse same:04004}.    Compared to one year ago, the patient feels her mental   health is {better worse same:54148}.    Recent Hospitalizations:  {Hospital Admission Status in the last 365 days:33889}      Current Medical Providers:  Patient Care Team:  Yahaira Brock APRN as PCP - General (Family Medicine)  Elsie Wynn MD as PCP - Family Medicine    Outpatient Medications Prior to Visit   Medication Sig Dispense Refill    acyclovir (ZOVIRAX) 5 % cream Apply 1 application  topically to the appropriate area as directed As Needed.      albuterol (PROVENTIL) (2.5 MG/3ML) 0.083% nebulizer solution Take 2.5 mg by nebulization As Needed.      amLODIPine (NORVASC) 5 MG tablet 1/2 tablet in the morning. Can take extra 1/2 tablet at night if needed for increased BP > 150/90 (Patient taking differently: 1 tablet Daily. 1/2 tablet in the morning.) 90 tablet 2    atorvastatin (LIPITOR) 10 MG tablet Take 1 tablet by mouth Every Night.      calcipotriene (DOVONEX) 0.005 % cream As Needed.      Cholecalciferol (Vitamin D3) 1.25 MG (10642 UT) tablet Take 1 tablet by mouth 1 (One) Time Per Week.      dicyclomine (BENTYL) 10 MG capsule Take 1 capsule by mouth 2 " (Two) Times a Day. (Patient taking differently: Take 1 capsule by mouth Daily.) 60 capsule 3    DULoxetine (CYMBALTA) 30 MG capsule Take 1 capsule by mouth Daily.      ELDERBERRY PO Take  by mouth As Needed.      famotidine (PEPCID) 20 MG tablet Take 1 tablet by mouth 2 (Two) Times a Day.      hydroCHLOROthiazide (HYDRODIURIL) 12.5 MG tablet Take 1 tablet by mouth Daily. 90 tablet 2    hydrocortisone 2.5 % lotion Apply 1 application  topically to the appropriate area as directed As Needed.      levocetirizine (XYZAL) 5 MG tablet Take 1 tablet by mouth Every Other Day. Evening      levothyroxine (SYNTHROID, LEVOTHROID) 50 MCG tablet Take 1 tablet by mouth Every Morning. 30 tablet 1    LUTEIN-ZEAXANTHIN-BILBERRY PO Take 1 tablet by mouth Daily.      metoprolol tartrate (LOPRESSOR) 25 MG tablet Take 1 tablet by mouth 2 (Two) Times a Day. 180 tablet 2    montelukast (SINGULAIR) 10 MG tablet Take 1 tablet by mouth Every Other Day.      multivitamin with minerals tablet tablet Take 1 tablet by mouth Daily.      mupirocin (BACTROBAN) 2 % cream Apply 1 application  topically to the appropriate area as directed As Needed.      omeprazole (priLOSEC) 20 MG capsule Take 1 capsule by mouth Daily. 30 capsule 0    valACYclovir (VALTREX) 1000 MG tablet Take 1 tablet by mouth As Needed.      cefdinir (OMNICEF) 300 MG capsule Take 1 capsule by mouth 2 (Two) Times a Day. (Patient not taking: Reported on 1/8/2024) 20 capsule 0    EPINEPHrine (EPIPEN) 0.3 MG/0.3ML solution auto-injector injection Inject 0.3 mL under the skin into the appropriate area as directed 1 (One) Time.      estradiol (ESTRACE) 0.1 MG/GM vaginal cream  (Patient not taking: Reported on 1/8/2024)      Lutein 6 MG capsule 18 mg. (Patient not taking: Reported on 1/8/2024)      methylPREDNISolone (MEDROL) 4 MG dose pack Take as directed on package instructions. (Patient not taking: Reported on 1/8/2024) 21 each 0    sucralfate (CARAFATE) 1 g tablet 1 tab(s) (Patient  "not taking: Reported on 1/8/2024)       No facility-administered medications prior to visit.       No opioid medication identified on active medication list. I have reviewed chart for other potential  high risk medication/s and harmful drug interactions in the elderly.        Aspirin is not on active medication list.  {ASPIRIN NOT ON MEDICATION LIST INDICATED/NOT INDICATED:45284}.    Patient Active Problem List   Diagnosis    Carcinoid heart disease    Chronic chest pain    Palpitations    Sinus tachycardia    Syncope    Diabetes    Primary hypertension    Carcinoid tumor of small intestine    SVT (supraventricular tachycardia)    Shortness of breath    Migraine    Epigastric pain    Impaired glucose tolerance    Chest pain    Diarrhea    External hemorrhoids    Early satiety    Rectal pain    Right lower quadrant abdominal pain    Flatulence, eructation and gas pain    Heartburn    Regurgitation    Bilateral edema of lower extremity    Vitamin D deficiency    Hypothyroidism    Hypercholesteremia    Dizziness    Chronic fatigue    Metabolic syndrome     Advance Care Planning   Advance Care Planning     Advance Directive is not on file.  {ACP Discussion, Advance Directive not in EMR:81239}     Objective    Vitals:    01/08/24 0829   BP: 122/84   Pulse: 81   Resp: 18   Temp: 96.6 °F (35.9 °C)   TempSrc: Temporal   SpO2: 97%   Weight: 77.1 kg (170 lb)   Height: 165.1 cm (65\")     Estimated body mass index is 28.29 kg/m² as calculated from the following:    Height as of this encounter: 165.1 cm (65\").    Weight as of this encounter: 77.1 kg (170 lb).           Does the patient have evidence of cognitive impairment?   {Yes/No:93434}            HEALTH RISK ASSESSMENT    Smoking Status:  Social History     Tobacco Use   Smoking Status Never    Passive exposure: Past   Smokeless Tobacco Never     Alcohol Consumption:  Social History     Substance and Sexual Activity   Alcohol Use No     Fall Risk Screen:    STEADI Fall Risk " Assessment was completed, and patient is at MODERATE risk for falls. Assessment completed on:2024    Depression Screenin/8/2024     8:19 AM   PHQ-2/PHQ-9 Depression Screening   Little Interest or Pleasure in Doing Things 0-->not at all   Feeling Down, Depressed or Hopeless 0-->not at all   PHQ-9: Brief Depression Severity Measure Score 0       Health Habits and Functional and Cognitive Screenin/8/2024     8:18 AM   Functional & Cognitive Status   Do you have difficulty preparing food and eating? No   Do you have difficulty bathing yourself, getting dressed or grooming yourself? No   Do you have difficulty using the toilet? No   Do you have difficulty moving around from place to place? No   Do you have trouble with steps or getting out of a bed or a chair? No   Current Diet Unhealthy Diet   Dental Exam Up to date   Eye Exam Up to date   Exercise (times per week) 0 times per week   Current Exercises Include No Regular Exercise   Do you need help using the phone?  No   Are you deaf or do you have serious difficulty hearing?  No   Do you need help to go to places out of walking distance? No   Do you need help shopping? No   Do you need help preparing meals?  No   Do you need help with housework?  No   Do you need help with laundry? No   Do you need help taking your medications? No   Do you need help managing money? No   Do you ever drive or ride in a car without wearing a seat belt? No   Have you felt unusual stress, anger or loneliness in the last month? No   Who do you live with? Spouse   If you need help, do you have trouble finding someone available to you? No   Have you been bothered in the last four weeks by sexual problems? No   Do you have difficulty concentrating, remembering or making decisions? Yes       Age-appropriate Screening Schedule:  Refer to the list below for future screening recommendations based on patient's age, sex and/or medical conditions. Orders for these recommended  tests are listed in the plan section. The patient has been provided with a written plan.    Health Maintenance   Topic Date Due    DIABETIC FOOT EXAM  Never done    DIABETIC EYE EXAM  Never done    ZOSTER VACCINE (3 of 3) 03/15/2021    DXA SCAN  01/03/2024    HEMOGLOBIN A1C  01/07/2024    MAMMOGRAM  09/28/2024 (Originally 11/4/2016)    Pneumococcal Vaccine 65+ (3 of 3 - PPSV23 or PCV20) 12/18/2024 (Originally 1/14/2023)    PAP SMEAR  12/23/2024 (Originally 4/14/2017)    LIPID PANEL  07/07/2024    URINE MICROALBUMIN  07/07/2024    BMI FOLLOWUP  07/07/2024    ANNUAL WELLNESS VISIT  01/08/2025    TDAP/TD VACCINES (3 - Td or Tdap) 07/07/2033    COLORECTAL CANCER SCREENING  10/09/2033    HEPATITIS C SCREENING  Completed    INFLUENZA VACCINE  Completed    COVID-19 Vaccine  Discontinued                  CMS Preventative Services Quick Reference  Risk Factors Identified During Encounter:    {Medicare Wellness Risk Factors:68705}    The above risks/problems have been discussed with the patient.  Pertinent information has been shared with the patient in the After Visit Summary.    Diagnoses and all orders for this visit:    1. Encounter for subsequent annual wellness visit (AWV) in Medicare patient (Primary)        Follow Up:   Next Medicare Wellness visit to be scheduled in 1 year.      An After Visit Summary and PPPS were made available to the patient.

## 2024-01-08 NOTE — PROGRESS NOTES
The ABCs of the Annual Wellness Visit  Subsequent Medicare Wellness Visit    Subjective      Radha Villa is a 63 y.o. female who presents for a Subsequent Medicare Wellness Visit.    The following portions of the patient's history were reviewed and   updated as appropriate: allergies, current medications, past family history, past medical history, past social history, past surgical history, and problem list.    Compared to one year ago, the patient feels her physical   health is the same.    Compared to one year ago, the patient feels her mental   health is worse.    Recent Hospitalizations:  She was not admitted to the hospital during the last year.       Current Medical Providers:  Patient Care Team:  Yahaira Brock APRN as PCP - General (Family Medicine)  Elsie Wynn MD as PCP - Family Medicine    Outpatient Medications Prior to Visit   Medication Sig Dispense Refill    acyclovir (ZOVIRAX) 5 % cream Apply 1 application  topically to the appropriate area as directed As Needed.      albuterol (PROVENTIL) (2.5 MG/3ML) 0.083% nebulizer solution Take 2.5 mg by nebulization As Needed.      amLODIPine (NORVASC) 5 MG tablet 1/2 tablet in the morning. Can take extra 1/2 tablet at night if needed for increased BP > 150/90 (Patient taking differently: 1 tablet Daily. 1/2 tablet in the morning.) 90 tablet 2    atorvastatin (LIPITOR) 10 MG tablet Take 1 tablet by mouth Every Night.      calcipotriene (DOVONEX) 0.005 % cream As Needed.      Cholecalciferol (Vitamin D3) 1.25 MG (10858 UT) tablet Take 1 tablet by mouth 1 (One) Time Per Week.      dicyclomine (BENTYL) 10 MG capsule Take 1 capsule by mouth 2 (Two) Times a Day. (Patient taking differently: Take 1 capsule by mouth Daily.) 60 capsule 3    DULoxetine (CYMBALTA) 30 MG capsule Take 1 capsule by mouth Daily.      ELDERBERRY PO Take  by mouth As Needed.      famotidine (PEPCID) 20 MG tablet Take 1 tablet by mouth 2 (Two) Times a Day.      hydroCHLOROthiazide  (HYDRODIURIL) 12.5 MG tablet Take 1 tablet by mouth Daily. 90 tablet 2    hydrocortisone 2.5 % lotion Apply 1 application  topically to the appropriate area as directed As Needed.      levocetirizine (XYZAL) 5 MG tablet Take 1 tablet by mouth Every Other Day. Evening      levothyroxine (SYNTHROID, LEVOTHROID) 50 MCG tablet Take 1 tablet by mouth Every Morning. 30 tablet 1    LUTEIN-ZEAXANTHIN-BILBERRY PO Take 1 tablet by mouth Daily.      metoprolol tartrate (LOPRESSOR) 25 MG tablet Take 1 tablet by mouth 2 (Two) Times a Day. 180 tablet 2    montelukast (SINGULAIR) 10 MG tablet Take 1 tablet by mouth Every Other Day.      multivitamin with minerals tablet tablet Take 1 tablet by mouth Daily.      mupirocin (BACTROBAN) 2 % cream Apply 1 application  topically to the appropriate area as directed As Needed.      omeprazole (priLOSEC) 20 MG capsule Take 1 capsule by mouth Daily. 30 capsule 0    valACYclovir (VALTREX) 1000 MG tablet Take 1 tablet by mouth As Needed.      EPINEPHrine (EPIPEN) 0.3 MG/0.3ML solution auto-injector injection Inject 0.3 mL under the skin into the appropriate area as directed 1 (One) Time.      estradiol (ESTRACE) 0.1 MG/GM vaginal cream  (Patient not taking: Reported on 1/8/2024)      Lutein 6 MG capsule 18 mg. (Patient not taking: Reported on 1/8/2024)      methylPREDNISolone (MEDROL) 4 MG dose pack Take as directed on package instructions. (Patient not taking: Reported on 1/8/2024) 21 each 0    sucralfate (CARAFATE) 1 g tablet 1 tab(s) (Patient not taking: Reported on 1/8/2024)      cefdinir (OMNICEF) 300 MG capsule Take 1 capsule by mouth 2 (Two) Times a Day. (Patient not taking: Reported on 1/8/2024) 20 capsule 0     No facility-administered medications prior to visit.       No opioid medication identified on active medication list. I have reviewed chart for other potential  high risk medication/s and harmful drug interactions in the elderly.      Aspirin is not on active medication list.   "Aspirin use is contraindicated for this patient due to: history of bleeding and pt has significant history of bowel surgeries.  .    Patient Active Problem List   Diagnosis    Carcinoid heart disease    Chronic chest pain    Palpitations    Sinus tachycardia    Syncope    Diabetes    Primary hypertension    Carcinoid tumor of small intestine    SVT (supraventricular tachycardia)    Shortness of breath    Migraine    Epigastric pain    Impaired glucose tolerance    Chest pain    Diarrhea    External hemorrhoids    Early satiety    Rectal pain    Right lower quadrant abdominal pain    Flatulence, eructation and gas pain    Heartburn    Regurgitation    Bilateral edema of lower extremity    Vitamin D deficiency    Hypothyroidism    Hypercholesteremia    Dizziness    Chronic fatigue    Metabolic syndrome     Advance Care Planning   Advance Care Planning     Advance Directive is not on file.  ACP discussion was declined by the patient. Patient does not have an advance directive, information provided.     Objective    Vitals:    01/08/24 0829   BP: 122/84   Pulse: 81   Resp: 18   Temp: 96.6 °F (35.9 °C)   TempSrc: Temporal   SpO2: 97%   Weight: 77.1 kg (170 lb)   Height: 165.1 cm (65\")     Estimated body mass index is 28.29 kg/m² as calculated from the following:    Height as of this encounter: 165.1 cm (65\").    Weight as of this encounter: 77.1 kg (170 lb).      Does the patient have evidence of cognitive impairment?   Yes: Referral to neurology ordered.    Lab Results   Component Value Date    HGBA1C 5.9 (A) 01/08/2024          HEALTH RISK ASSESSMENT    Smoking Status:  Social History     Tobacco Use   Smoking Status Never    Passive exposure: Past   Smokeless Tobacco Never     Alcohol Consumption:  Social History     Substance and Sexual Activity   Alcohol Use No     Fall Risk Screen:    STEADI Fall Risk Assessment was completed, and patient is at MODERATE risk for falls. Assessment completed on:1/8/2024    Depression " Screenin/8/2024     8:19 AM   PHQ-2/PHQ-9 Depression Screening   Little Interest or Pleasure in Doing Things 0-->not at all   Feeling Down, Depressed or Hopeless 0-->not at all   PHQ-9: Brief Depression Severity Measure Score 0       Health Habits and Functional and Cognitive Screenin/8/2024     8:18 AM   Functional & Cognitive Status   Do you have difficulty preparing food and eating? No   Do you have difficulty bathing yourself, getting dressed or grooming yourself? No   Do you have difficulty using the toilet? No   Do you have difficulty moving around from place to place? No   Do you have trouble with steps or getting out of a bed or a chair? No   Current Diet Unhealthy Diet   Dental Exam Up to date   Eye Exam Up to date   Exercise (times per week) 0 times per week   Current Exercises Include No Regular Exercise   Do you need help using the phone?  No   Are you deaf or do you have serious difficulty hearing?  No   Do you need help to go to places out of walking distance? No   Do you need help shopping? No   Do you need help preparing meals?  No   Do you need help with housework?  No   Do you need help with laundry? No   Do you need help taking your medications? No   Do you need help managing money? No   Do you ever drive or ride in a car without wearing a seat belt? No   Have you felt unusual stress, anger or loneliness in the last month? No   Who do you live with? Spouse   If you need help, do you have trouble finding someone available to you? No   Have you been bothered in the last four weeks by sexual problems? No   Do you have difficulty concentrating, remembering or making decisions? Yes       Age-appropriate Screening Schedule:  Refer to the list below for future screening recommendations based on patient's age, sex and/or medical conditions. Orders for these recommended tests are listed in the plan section. The patient has been provided with a written plan.    Health Maintenance   Topic  Date Due    DIABETIC FOOT EXAM  Never done    DIABETIC EYE EXAM  Never done    DXA SCAN  01/03/2024    MAMMOGRAM  09/28/2024 (Originally 11/4/2016)    Pneumococcal Vaccine 65+ (3 of 3 - PPSV23 or PCV20) 12/18/2024 (Originally 1/14/2023)    PAP SMEAR  12/23/2024 (Originally 4/14/2017)    ZOSTER VACCINE (3 of 3) 12/27/2024 (Originally 3/15/2021)    LIPID PANEL  07/07/2024    URINE MICROALBUMIN  07/07/2024    BMI FOLLOWUP  07/07/2024    HEMOGLOBIN A1C  07/08/2024    ANNUAL WELLNESS VISIT  01/08/2025    TDAP/TD VACCINES (3 - Td or Tdap) 07/07/2033    COLORECTAL CANCER SCREENING  10/09/2033    HEPATITIS C SCREENING  Completed    INFLUENZA VACCINE  Completed    COVID-19 Vaccine  Discontinued                  CMS Preventative Services Quick Reference  Risk Factors Identified During Encounter:    Polypharmacy: Medication List reviewed    The above risks/problems have been discussed with the patient.  Pertinent information has been shared with the patient in the After Visit Summary.    Diagnoses and all orders for this visit:    1. Encounter for subsequent annual wellness visit (AWV) in Medicare patient (Primary)  Comments:  continue current plan of care  Orders:  -     Cancel: CBC w AUTO Differential; Future  -     Cancel: Comprehensive metabolic panel; Future  -     Cancel: Lipid panel; Future  -     Cancel: TSH; Future  -     CBC w AUTO Differential; Future  -     Comprehensive metabolic panel; Future  -     Lipid panel; Future  -     TSH; Future    2. Primary hypertension  -     Cancel: CBC w AUTO Differential; Future  -     Cancel: Comprehensive metabolic panel; Future  -     Cancel: Lipid panel; Future  -     Cancel: TSH; Future  -     CBC w AUTO Differential; Future  -     Comprehensive metabolic panel; Future  -     Lipid panel; Future  -     TSH; Future    3. Hypothyroidism, unspecified type  -     Cancel: TSH; Future  -     TSH; Future    4. Chronic fatigue  -     Cancel: CBC w AUTO Differential; Future  -      Cancel: TSH; Future  -     Cancel: Vitamin B12; Future  -     CBC w AUTO Differential; Future  -     TSH; Future  -     Vitamin B12; Future    5. Hyperglycemia  -     Cancel: Comprehensive metabolic panel; Future  -     POC Glycosylated Hemoglobin (Hb A1C)  -     Comprehensive metabolic panel; Future    6. Vitamin D deficiency  -     Cancel: Vitamin D,25-Hydroxy; Future  -     Vitamin D,25-Hydroxy; Future    7. Memory deficit  -     Ambulatory Referral to Neurology      The preventive exam has been reviewed in detail.  The patient has been fully counseled on preventative guidelines for vaccines, cancer screenings, and other health maintenance needs.   The patient has been counseled on guidelines for maintaining a lifestyle to promote good health and to minimize chronic diseases.  The patient has been assisted with scheduling these healthcare procedures for the coming year and given a written document of health maintenance and anticipatory guidance for age with the AVS.    I spent 30 minutes caring for Radha on this date of service. This time includes time spent by me in the following activities:preparing for the visit, reviewing tests, obtaining and/or reviewing a separately obtained history, performing a medically appropriate examination and/or evaluation , counseling and educating the patient/family/caregiver, ordering medications, tests, or procedures, referring and communicating with other health care professionals , documenting information in the medical record, independently interpreting results and communicating that information with the patient/family/caregiver, and care coordination.     Follow Up:   Next Medicare Wellness visit to be scheduled in 1 year.      An After Visit Summary and PPPS were made available to the patient.        This document has been electronically signed by CALI Lake  January 9, 2024 04:51 EST    Patient has been erroneously marked as diabetic. Based on the available clinical  information, she does not have diabetes and should therefore be excluded from diabetic health maintenance and quality measures for the remainder of the reporting period.

## 2024-01-17 ENCOUNTER — OFFICE VISIT (OUTPATIENT)
Dept: CARDIOLOGY | Facility: CLINIC | Age: 64
End: 2024-01-17
Payer: MEDICARE

## 2024-01-17 VITALS
BODY MASS INDEX: 27.66 KG/M2 | HEART RATE: 74 BPM | SYSTOLIC BLOOD PRESSURE: 122 MMHG | WEIGHT: 166 LBS | DIASTOLIC BLOOD PRESSURE: 70 MMHG | HEIGHT: 65 IN

## 2024-01-17 DIAGNOSIS — R00.2 PALPITATIONS: ICD-10-CM

## 2024-01-17 DIAGNOSIS — E78.00 HYPERCHOLESTEREMIA: ICD-10-CM

## 2024-01-17 DIAGNOSIS — I10 PRIMARY HYPERTENSION: Primary | ICD-10-CM

## 2024-01-17 DIAGNOSIS — R60.0 BILATERAL EDEMA OF LOWER EXTREMITY: ICD-10-CM

## 2024-01-17 PROCEDURE — 3074F SYST BP LT 130 MM HG: CPT | Performed by: NURSE PRACTITIONER

## 2024-01-17 PROCEDURE — 99213 OFFICE O/P EST LOW 20 MIN: CPT | Performed by: NURSE PRACTITIONER

## 2024-01-17 PROCEDURE — 1160F RVW MEDS BY RX/DR IN RCRD: CPT | Performed by: NURSE PRACTITIONER

## 2024-01-17 PROCEDURE — 3078F DIAST BP <80 MM HG: CPT | Performed by: NURSE PRACTITIONER

## 2024-01-17 PROCEDURE — 1159F MED LIST DOCD IN RCRD: CPT | Performed by: NURSE PRACTITIONER

## 2024-01-17 RX ORDER — ATORVASTATIN CALCIUM 10 MG/1
10 TABLET, FILM COATED ORAL NIGHTLY
Qty: 90 TABLET | Refills: 2 | Status: SHIPPED | OUTPATIENT
Start: 2024-01-17

## 2024-01-17 RX ORDER — HYDROCHLOROTHIAZIDE 12.5 MG/1
12.5 TABLET ORAL DAILY
Qty: 90 TABLET | Refills: 2 | Status: SHIPPED | OUTPATIENT
Start: 2024-01-17

## 2024-01-17 NOTE — PROGRESS NOTES
Chief Complaint   Patient presents with    Follow-up     Cardiac management    LABS     Labs per PCP with results on labs  also had labs per Dr Ravi   Had CT carvajal  and Colonoscopy , has recurrence of cancer     Med Refill     Needs refills on  HCTZ and Metoprolol 90 day supply to [facility]  Needs refills on Atorvastatin 90 day supply to [facility]       Subjective       Radhamitra Villa is a 64 y.o. female with HTN, hypothyroidism, and hypercholesterolemia.  She was initially seen 2021 for cardiac evaluation of CP, dizziness, and SOA.  Dicyclomine added for GI symptoms.  Stress test revealed normal myocardial perfusion, echocardiogram showed mild LVH with EF around 60%, mild MR and TR.  RVSP was 33 mmHg.  14-day cardiac monitor showed baseline sinus rhythm, rare PACs/PVCs, One 4 beat run of VT and 18 beat run of SVT.  No pauses recorded.  Patient had CP and palpitations without EKG changes.  Overnight oxygen monitor was unremarkable.  In 2022 carotid ultrasound was negative for stenosis.  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  Recent CT scan and anastomosis biopsy with results: Mucosal changes consistent with anastomotic site including erosion and granulation tissue, negative for tumor.  Plan for repeat scan 3 months and trend CBCs for blood loss.    Today she returns to the office for follow-up visit.  She denies cardiac symptoms or concerns.  Blood pressure remains better controlled after decreased dose of amlodipine and hydrochlorothiazide.  She admits to mild elevation in BP with joint pain that improves with pain management.    Cardiac History:    Past Surgical History:   Procedure Laterality Date    APPENDECTOMY      CARDIOVASCULAR STRESS TEST  04/11/2002    @ [facility].- 72% THR. EF 71%. Negative    CARDIOVASCULAR STRESS TEST  08/25/2014    Dr. Cantrell- 6 Min.30 Sec. 7.6 METS. 81% THR.EF 77%. Negative    CARDIOVASCULAR  STRESS TEST  12/22/2021    6 Min. 7.00 METS. 85% THR. BP- 153/85. EF > 70%. Negative    CHOLECYSTECTOMY      COLON SURGERY      COLONOSCOPY      COLONOSCOPY N/A 09/13/2016    Procedure: COLONOSCOPY FOR SCREENING CPT CODE: ;  Surgeon: Milagros Trevizo DO;  Location: [facility];  Service:     CONVERTED (HISTORICAL) HOLTER  01/10/2022    14 Days. Avg 87. . One run of VT & SVT.    DILATION AND CURETTAGE, DIAGNOSTIC / THERAPEUTIC      ECHO - CONVERTED  12/22/2021    EF 60%. Mild MR. RVSP- 34 mmHg    EYE SURGERY      HYSTERECTOMY      OTHER SURGICAL HISTORY  12/16/2021    Pulse O2- Normal    US CAROTID UNILATERAL Bilateral 12/14/2022    No acute findings in the neck       Current Outpatient Medications   Medication Sig Dispense Refill    acyclovir (ZOVIRAX) 5 % cream Apply 1 application  topically to the appropriate area as directed As Needed.      albuterol (PROVENTIL) (2.5 MG/3ML) 0.083% nebulizer solution Take 2.5 mg by nebulization As Needed.      amLODIPine (NORVASC) 5 MG tablet 1/2 tablet in the morning. Can take extra 1/2 tablet at night if needed for increased BP > 150/90 (Patient taking differently: 1 tablet Daily. 1/2 tablet in the morning.) 90 tablet 2    atorvastatin (LIPITOR) 10 MG tablet Take 1 tablet by mouth Every Night. 90 tablet 2    calcipotriene (DOVONEX) 0.005 % cream As Needed.      Cholecalciferol (Vitamin D3) 1.25 MG (30278 UT) tablet Take 1 tablet by mouth 1 (One) Time Per Week.      dicyclomine (BENTYL) 10 MG capsule Take 1 capsule by mouth 2 (Two) Times a Day. (Patient taking differently: Take 1 capsule by mouth Daily.) 60 capsule 3    DULoxetine (CYMBALTA) 30 MG capsule Take 1 capsule by mouth Daily.      ELDERBERRY PO Take  by mouth As Needed.      EPINEPHrine (EPIPEN) 0.3 MG/0.3ML solution auto-injector injection Inject 0.3 mL under the skin into the appropriate area as directed 1 (One) Time.      estradiol (ESTRACE) 0.1 MG/GM vaginal cream       famotidine (PEPCID) 20 MG tablet  Take 1 tablet by mouth 2 (Two) Times a Day.      hydroCHLOROthiazide (HYDRODIURIL) 12.5 MG tablet Take 1 tablet by mouth Daily. 90 tablet 2    hydrocortisone 2.5 % lotion Apply 1 application  topically to the appropriate area as directed As Needed.      levocetirizine (XYZAL) 5 MG tablet Take 1 tablet by mouth Every Other Day. Evening      levothyroxine (SYNTHROID, LEVOTHROID) 50 MCG tablet Take 1 tablet by mouth Every Morning. 30 tablet 1    Lutein 6 MG capsule 18 mg.      LUTEIN-ZEAXANTHIN-BILBERRY PO Take 1 tablet by mouth Daily.      metoprolol tartrate (LOPRESSOR) 25 MG tablet Take 1 tablet by mouth 2 (Two) Times a Day. 180 tablet 2    montelukast (SINGULAIR) 10 MG tablet Take 1 tablet by mouth Every Other Day.      multivitamin with minerals tablet tablet Take 1 tablet by mouth Daily.      mupirocin (BACTROBAN) 2 % cream Apply 1 application  topically to the appropriate area as directed As Needed.      omeprazole (priLOSEC) 20 MG capsule Take 1 capsule by mouth Daily. 30 capsule 0    sucralfate (CARAFATE) 1 g tablet       valACYclovir (VALTREX) 1000 MG tablet Take 1 tablet by mouth As Needed.       No current facility-administered medications for this visit.       Codeine, Levofloxacin, and Lisinopril    Past Medical History:   Diagnosis Date    Alpha-galactosidase A deficiency     Arthritis     Borderline diabetic     Cancer     CARCINOID TUMOR    Carcinoid tumor     ileum    Diabetes mellitus     BORDERLINE    Hyperlipidemia     Hypertension     Hypothyroidism     Hypothyroidism 12/14/2021    PONV (postoperative nausea and vomiting)     Regurgitation     Tachycardia     Vitamin D deficiency        Social History     Socioeconomic History    Marital status:    Tobacco Use    Smoking status: Never     Passive exposure: Past    Smokeless tobacco: Never   Vaping Use    Vaping Use: Never used   Substance and Sexual Activity    Alcohol use: No    Drug use: No    Sexual activity: Defer       Family History  "  Problem Relation Age of Onset    Heart attack Other     Diabetes Other     Stroke Other     Cancer Other     Heart disease Other         Cardiac disorder    Heart disease Mother     Atrial fibrillation Mother     Skin cancer Mother     Heart disease Maternal Grandfather     Heart disease Paternal Grandmother        Review of Systems   Cardiovascular:  Positive for leg swelling (Mild, no worse). Negative for chest pain, dyspnea on exertion, near-syncope and palpitations.   Hematologic/Lymphatic: Negative for bleeding problem. Does not bruise/bleed easily.   Musculoskeletal:  Positive for joint pain, myalgias and stiffness.   Gastrointestinal:  Positive for bloating, abdominal pain and change in bowel habit. Negative for dysphagia.        BP Readings from Last 5 Encounters:   01/17/24 122/70   01/08/24 122/84   12/18/23 122/66   09/28/23 132/76   08/31/23 114/76       Wt Readings from Last 5 Encounters:   01/17/24 75.3 kg (166 lb)   01/08/24 77.1 kg (170 lb)   12/18/23 77.1 kg (170 lb)   09/28/23 78.1 kg (172 lb 3.2 oz)   08/31/23 78 kg (172 lb)       Objective     /70 (BP Location: Left arm, Patient Position: Sitting)   Pulse 74   Ht 165.1 cm (65\")   Wt 75.3 kg (166 lb)   BMI 27.62 kg/m²     Vitals and nursing note reviewed.   Constitutional:       Appearance: Not in distress.   Eyes:      Conjunctiva/sclera: Conjunctivae normal.   HENT:      Head: Normocephalic.   Pulmonary:      Effort: Pulmonary effort is normal.      Breath sounds: Normal breath sounds.   Cardiovascular:      PMI at left midclavicular line. Normal rate. Regular rhythm.      Murmurs: There is no murmur.   Pulses:     Intact distal pulses.   Edema:     Peripheral edema absent.   Abdominal:      General: Bowel sounds are normal.      Palpations: Abdomen is soft.   Musculoskeletal:      Cervical back: Normal range of motion and neck supple. Skin:     General: Skin is warm and dry.   Neurological:      Mental Status: Alert, oriented to " person, place, and time and oriented to person, place and time.          Procedures: None today         Assessment & Plan   Diagnoses and all orders for this visit:    1. Primary hypertension (Primary)  -     metoprolol tartrate (LOPRESSOR) 25 MG tablet; Take 1 tablet by mouth 2 (Two) Times a Day.  Dispense: 180 tablet; Refill: 2    2. Palpitations    3. Bilateral edema of lower extremity  -     hydroCHLOROthiazide (HYDRODIURIL) 12.5 MG tablet; Take 1 tablet by mouth Daily.  Dispense: 90 tablet; Refill: 2    4. Hypercholesteremia  -     atorvastatin (LIPITOR) 10 MG tablet; Take 1 tablet by mouth Every Night.  Dispense: 90 tablet; Refill: 2      Hypertension  -BP controlled  -Continue amlodipine 1/2 tablet daily, metoprolol, hydrochlorothiazide  -DASH diet  -Occasionally monitor BP, call for concerns     Palpitations  -Currently denied  -Continue beta-blocker  -Limit caffeine  -Maintain good hydration     Hyperlipidemia  -Labs followed by your office  -Continue Lipitor     Leg edema  -Mild, no worse from prior visit    Patient appears stable from a cardiac standpoint.  Continue current plan of care.  Annual follow-up visit scheduled per patient request.  Please call sooner for cardiac concerns.

## 2024-01-25 ENCOUNTER — OFFICE VISIT (OUTPATIENT)
Dept: NEUROLOGY | Facility: CLINIC | Age: 64
End: 2024-01-25
Payer: MEDICARE

## 2024-01-25 VITALS
DIASTOLIC BLOOD PRESSURE: 78 MMHG | HEART RATE: 73 BPM | OXYGEN SATURATION: 98 % | BODY MASS INDEX: 27.92 KG/M2 | SYSTOLIC BLOOD PRESSURE: 130 MMHG | TEMPERATURE: 98.2 F | RESPIRATION RATE: 14 BRPM | WEIGHT: 167.6 LBS | HEIGHT: 65 IN

## 2024-01-25 DIAGNOSIS — F33.1 MODERATE RECURRENT MAJOR DEPRESSION: ICD-10-CM

## 2024-01-25 DIAGNOSIS — G47.10 HYPERSOMNIA: ICD-10-CM

## 2024-01-25 DIAGNOSIS — R27.0 ATAXIA: ICD-10-CM

## 2024-01-25 DIAGNOSIS — R41.89 SUBJECTIVE MEMORY COMPLAINTS: Primary | ICD-10-CM

## 2024-01-25 PROCEDURE — 99214 OFFICE O/P EST MOD 30 MIN: CPT | Performed by: NURSE PRACTITIONER

## 2024-01-25 PROCEDURE — 1159F MED LIST DOCD IN RCRD: CPT | Performed by: NURSE PRACTITIONER

## 2024-01-25 PROCEDURE — 3075F SYST BP GE 130 - 139MM HG: CPT | Performed by: NURSE PRACTITIONER

## 2024-01-25 PROCEDURE — 3078F DIAST BP <80 MM HG: CPT | Performed by: NURSE PRACTITIONER

## 2024-01-25 PROCEDURE — 1160F RVW MEDS BY RX/DR IN RCRD: CPT | Performed by: NURSE PRACTITIONER

## 2024-01-25 RX ORDER — DONEPEZIL HYDROCHLORIDE 5 MG/1
5 TABLET, FILM COATED ORAL NIGHTLY
Qty: 30 TABLET | Refills: 2 | Status: SHIPPED | OUTPATIENT
Start: 2024-01-25

## 2024-01-25 RX ORDER — DULOXETIN HYDROCHLORIDE 60 MG/1
60 CAPSULE, DELAYED RELEASE ORAL DAILY
Qty: 90 CAPSULE | Refills: 1 | Status: SHIPPED | OUTPATIENT
Start: 2024-01-25

## 2024-01-25 RX ORDER — ERGOCALCIFEROL 1.25 MG/1
50000 CAPSULE ORAL
COMMUNITY
Start: 2024-01-09

## 2024-01-25 RX ORDER — TRAMADOL HYDROCHLORIDE 50 MG/1
50 TABLET ORAL EVERY 12 HOURS PRN
COMMUNITY
Start: 2024-01-04 | End: 2024-01-25

## 2024-01-25 RX ORDER — FLUCONAZOLE 150 MG/1
150 TABLET ORAL DAILY
COMMUNITY
Start: 2023-12-18

## 2024-01-25 NOTE — PROGRESS NOTES
New Patient Office Visit      Patient Name: Radha Villa  : 1960   MRN: [id number]     Referring Physician: Yahaira Brock APRN    Chief Complaint:    Chief Complaint   Patient presents with    [facility] Care     Memory; pt reports in the past few years she has had difficulty speaking.       History of Present Illness: Radha Villa is a 64 y.o. female who is here today to establish care with Neurology.  She has never been seen before in Neurology. She was referred to us from PCP (Vinod) for subjective memory changes. She reports subjective memory impairment x 2-3 years. 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 She admits to short term memory decline and struggling with word finding at times. Others have noticed her memory decline. She has been taking Phoshatidyl Serine Matrix OTC and does not think its been helping.  She does complain of excessive a.m. fatigue, and easily falling asleep while sitting in the chair and watching television.  She has even fallen asleep while driving.  Not sure if she snores.  Never had a sleep study.  She is an established patient of oncology ([facility]) for carcinoid tumor to the abdomen; she has FU imaging due .  She is raising her 2 granddaughters and this has helped her considerably with depression.  PCP has been giving her Cymbalta 30 mg daily for depression and arthritic pain; she denies suicidal ideation or homicidal ideation.    Social: Retired teacher. Adult adopted son- he lives in [place]. She has custody of her two granddaughters, ages 7 and 8. Graduated HS. Masters Degree- education. No ETOH, recreational drugs and tobacco. Happily , Anthony.     [facility] Neuro: Mother- Dementia, Paternal Uncle- Dementia     Recent Imaging: None.     Pertinent Medical History: OA, PreDM, HL, HTN, hypothyroidism, Vitamin D Deficiency, Chronic Neck Pain ([place]), Carcinoid Tumor (Surya @ [facility]), Chronic- D    Subjective       Review of Systems:   Review of Systems   Constitutional:  Positive for fatigue.   HENT: Negative.     Eyes: Negative.    Respiratory: Negative.     Cardiovascular: Negative.    Gastrointestinal: Negative.    Endocrine: Negative.    Genitourinary: Negative.    Musculoskeletal: Negative.    Skin: Negative.    Allergic/Immunologic: Negative.    Neurological:  Positive for speech difficulty and memory problem. Negative for confusion.   Hematological: Negative.    Psychiatric/Behavioral:  Positive for sleep disturbance and depressed mood. Negative for decreased concentration, self-injury and suicidal ideas. The patient is not nervous/anxious.    All other systems reviewed and are negative.      Past Medical History:   Past Medical History:   Diagnosis Date    Alpha-galactosidase A deficiency     Arthritis     Borderline diabetic     Cancer     CARCINOID TUMOR    Carcinoid tumor     ileum    Diabetes mellitus     BORDERLINE    Hyperlipidemia     Hypertension     Hypothyroidism     Hypothyroidism 12/14/2021    PONV (postoperative nausea and vomiting)     Regurgitation     Tachycardia     Vitamin D deficiency        Past Surgical History:   Past Surgical History:   Procedure Laterality Date    APPENDECTOMY      CARDIOVASCULAR STRESS TEST  04/11/2002    @ [facility].- 72% THR. EF 71%. Negative    CARDIOVASCULAR STRESS TEST  08/25/2014    Dr. Cantrell- 6 Min.30 Sec. 7.6 METS. 81% THR.EF 77%. Negative    CARDIOVASCULAR STRESS TEST  12/22/2021    6 Min. 7.00 METS. 85% THR. BP- 153/85. EF > 70%. Negative    CHOLECYSTECTOMY      COLON SURGERY      COLONOSCOPY      COLONOSCOPY N/A 09/13/2016    Procedure: COLONOSCOPY FOR SCREENING CPT CODE: ;  Surgeon: Milagros Trevizo DO;  Location: [facility];  Service:     CONVERTED (HISTORICAL) HOLTER  01/10/2022    14 Days. Avg 87. . One run of VT & SVT.    DILATION AND CURETTAGE, DIAGNOSTIC / THERAPEUTIC      ECHO - CONVERTED  12/22/2021    EF 60%. Mild MR. RVSP- 34 mmHg     EYE SURGERY      HYSTERECTOMY      OTHER SURGICAL HISTORY  12/16/2021    Pulse O2- Normal    US CAROTID UNILATERAL Bilateral 12/14/2022    No acute findings in the neck       Family History:   Family History   Problem Relation Age of Onset    Heart disease Mother     Atrial fibrillation Mother     Skin cancer Mother     Dementia Mother     Alzheimer's disease Maternal Aunt     Dementia Paternal Uncle     Heart disease Maternal Grandfather     Heart disease Paternal Grandmother     Heart attack Other     Diabetes Other     Stroke Other     Cancer Other     Heart disease Other         Cardiac disorder       Social History:   Social History     Socioeconomic History    Marital status:    Tobacco Use    Smoking status: Never     Passive exposure: Past    Smokeless tobacco: Never   Vaping Use    Vaping Use: Never used   Substance and Sexual Activity    Alcohol use: No    Drug use: No    Sexual activity: Defer       Medications:     Current Outpatient Medications:     acyclovir (ZOVIRAX) 5 % cream, Apply 1 application  topically to the appropriate area as directed As Needed., Disp: , Rfl:     albuterol (PROVENTIL) (2.5 MG/3ML) 0.083% nebulizer solution, Take 2.5 mg by nebulization As Needed., Disp: , Rfl:     amLODIPine (NORVASC) 5 MG tablet, 1/2 tablet in the morning. Can take extra 1/2 tablet at night if needed for increased BP > 150/90 (Patient taking differently: 1 tablet Daily. 1/2 tablet in the morning.), Disp: 90 tablet, Rfl: 2    atorvastatin (LIPITOR) 10 MG tablet, Take 1 tablet by mouth Every Night., Disp: 90 tablet, Rfl: 2    calcipotriene (DOVONEX) 0.005 % cream, As Needed., Disp: , Rfl:     Cholecalciferol (Vitamin D3) 1.25 MG (05777 UT) tablet, Take 1 tablet by mouth 1 (One) Time Per Week., Disp: , Rfl:     dicyclomine (BENTYL) 10 MG capsule, Take 1 capsule by mouth 2 (Two) Times a Day. (Patient taking differently: Take 1 capsule by mouth Daily.), Disp: 60 capsule, Rfl: 3    DULoxetine (CYMBALTA) 60  MG capsule, Take 1 capsule by mouth Daily., Disp: 90 capsule, Rfl: 1    ELDERBERRY PO, Take  by mouth As Needed., Disp: , Rfl:     EPINEPHrine (EPIPEN) 0.3 MG/0.3ML solution auto-injector injection, Inject 0.3 mL under the skin into the appropriate area as directed 1 (One) Time., Disp: , Rfl:     estradiol (ESTRACE) 0.1 MG/GM vaginal cream, , Disp: , Rfl:     famotidine (PEPCID) 20 MG tablet, Take 1 tablet by mouth 2 (Two) Times a Day., Disp: , Rfl:     fluconazole (DIFLUCAN) 150 MG tablet, Take 1 tablet by mouth Daily., Disp: , Rfl:     hydroCHLOROthiazide (HYDRODIURIL) 12.5 MG tablet, Take 1 tablet by mouth Daily., Disp: 90 tablet, Rfl: 2    hydrocortisone 2.5 % lotion, Apply 1 application  topically to the appropriate area as directed As Needed., Disp: , Rfl:     levocetirizine (XYZAL) 5 MG tablet, Take 1 tablet by mouth Every Other Day. Evening, Disp: , Rfl:     levothyroxine (SYNTHROID, LEVOTHROID) 50 MCG tablet, Take 1 tablet by mouth Every Morning., Disp: 30 tablet, Rfl: 1    Lutein 6 MG capsule, 18 mg., Disp: , Rfl:     LUTEIN-ZEAXANTHIN-BILBERRY PO, Take 1 tablet by mouth Daily., Disp: , Rfl:     metoprolol tartrate (LOPRESSOR) 25 MG tablet, Take 1 tablet by mouth 2 (Two) Times a Day., Disp: 180 tablet, Rfl: 2    montelukast (SINGULAIR) 10 MG tablet, Take 1 tablet by mouth Every Other Day., Disp: , Rfl:     multivitamin with minerals tablet tablet, Take 1 tablet by mouth Daily., Disp: , Rfl:     mupirocin (BACTROBAN) 2 % cream, Apply 1 application  topically to the appropriate area as directed As Needed., Disp: , Rfl:     omeprazole (priLOSEC) 20 MG capsule, Take 1 capsule by mouth Daily., Disp: 30 capsule, Rfl: 0    valACYclovir (VALTREX) 1000 MG tablet, Take 1 tablet by mouth As Needed., Disp: , Rfl:     vitamin D (ERGOCALCIFEROL) 1.25 MG (25623 UT) capsule capsule, Take 1 capsule by mouth Every 7 (Seven) Days., Disp: , Rfl:     donepezil (Aricept) 5 MG tablet, Take 1 tablet by mouth Every Night., Disp:  "30 tablet, Rfl: 2    Allergies:   Allergies   Allergen Reactions    Codeine Nausea And Vomiting    Levofloxacin GI Intolerance    Lisinopril Cough       Objective     Physical Exam:  Vital Signs:   Vitals:    01/25/24 0958   BP: 130/78   BP Location: Left arm   Patient Position: Sitting   Cuff Size: Adult   Pulse: 73   Resp: 14   Temp: 98.2 °F (36.8 °C)   TempSrc: Infrared   SpO2: 98%   Weight: 76 kg (167 lb 9.6 oz)   Height: 165.1 cm (65\")   PainSc: 0-No pain     Body mass index is 27.89 kg/m².     Physical Exam  Vitals and nursing note reviewed.   Constitutional:       General: She is not in acute distress.     Appearance: Normal appearance. She is normal weight.   HENT:      Head: Normocephalic.      Nose: Nose normal.      Mouth/Throat:      Mouth: Mucous membranes are moist.      Pharynx: Oropharynx is clear.   Eyes:      Extraocular Movements: Extraocular movements intact.      Conjunctiva/sclera: Conjunctivae normal.   Musculoskeletal:         General: Normal range of motion.      Cervical back: Normal range of motion and neck supple. No rigidity.   Skin:     General: Skin is warm and dry.      Capillary Refill: Capillary refill takes less than 2 seconds.   Neurological:      Mental Status: She is alert and oriented to person, place, and time.      Cranial Nerves: Cranial nerves 2-12 are intact. No cranial nerve deficit.      Sensory: No sensory deficit.      Motor: No weakness.      Coordination: Coordination normal. Romberg Test abnormal. Finger-Nose-Finger Test and Heel to Shin Test normal.      Gait: Gait is intact. Gait normal.      Deep Tendon Reflexes: Reflexes normal.      Reflex Scores:       Tricep reflexes are 2+ on the right side and 2+ on the left side.       Bicep reflexes are 2+ on the right side and 2+ on the left side.       Patellar reflexes are 2+ on the right side and 2+ on the left side.  Psychiatric:         Mood and Affect: Mood normal.         Speech: Speech normal.         Behavior: " Behavior normal.         Thought Content: Thought content normal.         Judgment: Judgment normal.         Neurologic Exam     Mental Status   Oriented to person, place, and time.   Oriented to country, city, area, street and number.   Oriented to year, month, date, day and season.   Registration: recalls 3 of 3 objects. Recall at 5 minutes: recalls 2 of 3 objects. Follows 3 step commands.   Attention: normal. Concentration: normal.   Speech: speech is normal   Level of consciousness: alert  Knowledge: good. Able to perform simple calculations.   Able to name object. Able to read. Able to repeat. Able to write. Normal comprehension.     Cranial Nerves   Cranial nerves II through XII intact.     Motor Exam   Muscle bulk: normal  Overall muscle tone: normal  Right arm tone: normal  Left arm tone: normal  Right arm pronator drift: absent  Left arm pronator drift: absent  Right leg tone: normal  Left leg tone: normal    Strength   Right biceps: 5/5  Left biceps: 5/5  Right triceps: 5/5  Left triceps: 5/5  Right quadriceps: 5/5  Left quadriceps: 5/5  Right hamstrin/5  Left hamstrin/5    Sensory Exam   Light touch normal.     Gait, Coordination, and Reflexes     Gait  Gait: normal    Coordination   Romberg: positive  Finger to nose coordination: normal  Heel to shin coordination: normal    Tremor   Resting tremor: absent  Intention tremor: absent  Action tremor: absent    Reflexes   Right biceps: 2+  Left biceps: 2+  Right triceps: 2+  Left triceps: 2+  Right patellar: 2+  Left patellar: 2+  Right Wynne: absent  Left Wynne: absentMild ataxia with moving from sitting to standing position.        PHQ-9 Total Score:  12     Assessment / Plan      Assessment/Plan:   Diagnoses and all orders for this visit:    1. Subjective memory complaints (Primary)  -     MRI Brain With & Without Contrast; Future  -     US Carotid Bilateral; Future  -     donepezil (Aricept) 5 MG tablet; Take 1 tablet by mouth Every Night.   Dispense: 30 tablet; Refill: 2    2. Hypersomnia  -     Home Sleep Study; Future    3. Moderate recurrent major depression  -     DULoxetine (CYMBALTA) 60 MG capsule; Take 1 capsule by mouth Daily.  Dispense: 90 capsule; Refill: 1    4. Ataxia  -     MRI Brain With & Without Contrast; Future  -     US Carotid Bilateral; Future         Follow Up:   Return in about 3 months (around 4/25/2024), or if symptoms worsen or fail to improve.    Anticipatory Guidance and Safety Reviewed  Patient Education Provided  MMSE 29/30  MRI Brain W/WO r/o demyelinating diease   [place] score 14  Home Sleep Study  PHQ9 score 12  Increase Duloxetine 60 mg Daily; SE reviewed   CDUS  Begin Aricept 5 mg QHS; SE reviewed  Declined offer of referral to [facility] memory clinic    We will considered Neuropsychological testing at FU with continued issues      RTC PRN or within 12 weeks or sooner with issues      CALI Barbour  [facility]

## 2024-02-06 ENCOUNTER — TELEPHONE (OUTPATIENT)
Dept: FAMILY MEDICINE CLINIC | Facility: CLINIC | Age: 64
End: 2024-02-06
Payer: MEDICARE

## 2024-02-06 ENCOUNTER — TELEPHONE (OUTPATIENT)
Dept: NEUROLOGY | Facility: CLINIC | Age: 64
End: 2024-02-06

## 2024-02-06 NOTE — TELEPHONE ENCOUNTER
Provider: GRAYSON COBLENTZ KNUTESON    Caller: FLETCHER     Relationship to Patient: [facility] HEMATOLOGY/ONCOLOGY [place]    Phone Number: [phone]    Reason for Call:  DR. STEPHEN ZAMORA WOULD LIKE TO GET THE LAST OV NOTE AND WOULD LIKE REPORTS FOR MRI AND ULS WHEN THEY ARE DONE PLEASE.      FAX # [phone]    THANK YOU

## 2024-02-06 NOTE — TELEPHONE ENCOUNTER
Caller: Radha Villa    Relationship: Self    Best call back number: [phone]    Caller requesting test results: YES    What test was performed:BONE DENSITY AND BLOOD WORK    When was the test performed: BONE DENSITY TEST WAS DONE AT 3-4 YEARS AG AND LAB WAS DONE LAST MONTH AT DR MALAVE

## 2024-02-15 ENCOUNTER — TELEPHONE (OUTPATIENT)
Dept: FAMILY MEDICINE CLINIC | Facility: CLINIC | Age: 64
End: 2024-02-15
Payer: MEDICARE

## 2024-02-27 ENCOUNTER — TELEPHONE (OUTPATIENT)
Dept: NEUROLOGY | Facility: CLINIC | Age: 64
End: 2024-02-27
Payer: MEDICARE

## 2024-02-27 NOTE — TELEPHONE ENCOUNTER
----- Message from CALI Sheikh sent at 2/27/2024  1:10 PM EST -----  Please call Radha with her sleep study results; her sleep study revealed a moderate BENI with a AHI 12/hr. I would like her to begin Autop[facility] therapy. Please fax the completed order set to [facility] and make her aware. Thanks

## 2024-03-04 RX ORDER — OMEPRAZOLE 20 MG/1
20 CAPSULE, DELAYED RELEASE ORAL DAILY
Qty: 90 CAPSULE | Refills: 0 | Status: SHIPPED | OUTPATIENT
Start: 2024-03-04

## 2024-03-04 RX ORDER — LEVOTHYROXINE SODIUM 0.05 MG/1
50 TABLET ORAL EVERY MORNING
Qty: 30 TABLET | Refills: 0 | Status: SHIPPED | OUTPATIENT
Start: 2024-03-04

## 2024-03-07 ENCOUNTER — OFFICE VISIT (OUTPATIENT)
Dept: NEUROLOGY | Facility: CLINIC | Age: 64
End: 2024-03-07
Payer: MEDICARE

## 2024-03-07 ENCOUNTER — TELEPHONE (OUTPATIENT)
Dept: NEUROLOGY | Facility: CLINIC | Age: 64
End: 2024-03-07

## 2024-03-07 VITALS
DIASTOLIC BLOOD PRESSURE: 62 MMHG | BODY MASS INDEX: 28.92 KG/M2 | RESPIRATION RATE: 14 BRPM | SYSTOLIC BLOOD PRESSURE: 110 MMHG | HEART RATE: 75 BPM | HEIGHT: 65 IN | WEIGHT: 173.6 LBS | TEMPERATURE: 97.8 F | OXYGEN SATURATION: 98 %

## 2024-03-07 DIAGNOSIS — G47.33 OSA (OBSTRUCTIVE SLEEP APNEA): ICD-10-CM

## 2024-03-07 DIAGNOSIS — F33.1 MODERATE RECURRENT MAJOR DEPRESSION: ICD-10-CM

## 2024-03-07 DIAGNOSIS — R41.89 SUBJECTIVE MEMORY COMPLAINTS: Primary | ICD-10-CM

## 2024-03-07 DIAGNOSIS — G43.909 ACUTE MIGRAINE: ICD-10-CM

## 2024-03-07 RX ORDER — TRAMADOL HYDROCHLORIDE 50 MG/1
50 TABLET ORAL DAILY
COMMUNITY

## 2024-03-07 RX ORDER — DONEPEZIL HYDROCHLORIDE 10 MG/1
10 TABLET, FILM COATED ORAL NIGHTLY
Qty: 30 TABLET | Refills: 3 | Status: SHIPPED | OUTPATIENT
Start: 2024-03-07

## 2024-03-07 RX ORDER — RIMEGEPANT SULFATE 75 MG/75MG
75 TABLET, ORALLY DISINTEGRATING ORAL DAILY PRN
Qty: 9 TABLET | Refills: 3 | Status: SHIPPED | OUTPATIENT
Start: 2024-03-07

## 2024-03-07 RX ORDER — DULOXETIN HYDROCHLORIDE 60 MG/1
60 CAPSULE, DELAYED RELEASE ORAL DAILY
Qty: 90 CAPSULE | Refills: 1 | Status: SHIPPED | OUTPATIENT
Start: 2024-03-07

## 2024-03-07 NOTE — TELEPHONE ENCOUNTER
Called and spoke to Radha about results. She verbalized understanding and had no further questions.

## 2024-03-07 NOTE — PROGRESS NOTES
Follow Up Office Visit      Patient Name: Radha Villa  : 1960   MRN: [id number]     Chief Complaint:    Chief Complaint   Patient presents with    Follow-up     Memory        History of Present Illness: Radha Villa is a 64 y.o. female who is here today to follow up with Neurology for subjective memory complaints and hypersomnia. She was last seen in clinic  (Maryanne). She was started on Aricept 5 mg QHS and reports good tolerance and compliance with medication. She denies SE. She feels the  medication has helped some. She has been doing lots of word searches and puzzles on her cell home. She did complete her Sleep Study for hypersomnia and needs to discuss the results today. Continues to be tired in AM and wakes in AM with HA. She has been taking Duloxetine 60 mg Daily and reports improved body pain and mood. No SI/HI. Denies depression. She did go for her MRI Brain and needs these results today. 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 She is very worried something has been missed and would like further imaging of carotid arteries. She reports she is dealing with a migraine today. + light and sound sensitivity. No N. No aura.     [facility]     Social: Retired teacher. Adult adopted son- he lives in [place]. She has custody of her two granddaughters, ages 7 and 8. Graduated HS. Masters Degree- education. No ETOH, recreational drugs and tobacco. Happily , Anthony.      [facility] Neuro: Mother- Dementia, Paternal Uncle- Dementia      Recent Imaging:     MRI Brain  + Frontal predominant subcortical and periventricular white matter changes, which can be seen as the sequela of migraine HA, chronic small vessel ischemia, or less likely demyelination.   Home Sleep Study  + AHI 12.2 Mild BENI  CDUS  - noncontributory     Pertinent Medical  History: OA, PreDM, HL, HTN, hypothyroidism, Vitamin D Deficiency, Chronic Neck Pain ([place]), Carcinoid Tumor ([facility] @ [facility]), Chronic-D    Subjective      Review of Systems:   Review of Systems   Constitutional:  Positive for fatigue.   HENT: Negative.     Eyes: Negative.    Respiratory: Negative.     Cardiovascular: Negative.    Gastrointestinal: Negative.    Endocrine: Negative.    Genitourinary: Negative.    Musculoskeletal: Negative.    Skin: Negative.    Allergic/Immunologic: Negative.    Neurological:  Positive for headache and memory problem.   Hematological: Negative.    Psychiatric/Behavioral:  Positive for sleep disturbance.    All other systems reviewed and are negative.      I have reviewed and the following portions of the patient's history were updated as appropriate: past family history, past medical history, past social history, past surgical history and problem list.    Medications:     Current Outpatient Medications:     acyclovir (ZOVIRAX) 5 % cream, Apply 1 Application topically to the appropriate area as directed As Needed., Disp: , Rfl:     albuterol (PROVENTIL) (2.5 MG/3ML) 0.083% nebulizer solution, Take 2.5 mg by nebulization As Needed., Disp: , Rfl:     amLODIPine (NORVASC) 5 MG tablet, 1/2 tablet in the morning. Can take extra 1/2 tablet at night if needed for increased BP > 150/90 (Patient taking differently: 1 tablet Daily. 1/2 tablet in the morning.), Disp: 90 tablet, Rfl: 2    atorvastatin (LIPITOR) 10 MG tablet, Take 1 tablet by mouth Every Night., Disp: 90 tablet, Rfl: 2    calcipotriene (DOVONEX) 0.005 % cream, As Needed., Disp: , Rfl:     Cholecalciferol (Vitamin D3) 1.25 MG (19048 UT) tablet, Take 1 tablet by mouth 1 (One) Time Per Week., Disp: , Rfl:     dicyclomine (BENTYL) 10 MG capsule, Take 1 capsule by mouth 2 (Two) Times a Day. (Patient taking differently: Take 1 capsule by mouth Daily.), Disp: 60 capsule, Rfl: 3    donepezil (ARICEPT) 10 MG tablet, Take 1 tablet by  mouth Every Night., Disp: 30 tablet, Rfl: 3    DULoxetine (CYMBALTA) 60 MG capsule, Take 1 capsule by mouth Daily., Disp: 90 capsule, Rfl: 1    ELDERBERRY PO, Take  by mouth As Needed., Disp: , Rfl:     EPINEPHrine (EPIPEN) 0.3 MG/0.3ML solution auto-injector injection, Inject 0.3 mL under the skin into the appropriate area as directed 1 (One) Time., Disp: , Rfl:     estradiol (ESTRACE) 0.1 MG/GM vaginal cream, , Disp: , Rfl:     famotidine (PEPCID) 20 MG tablet, Take 1 tablet by mouth 2 (Two) Times a Day., Disp: , Rfl:     fluconazole (DIFLUCAN) 150 MG tablet, Take 1 tablet by mouth Daily., Disp: , Rfl:     hydroCHLOROthiazide (HYDRODIURIL) 12.5 MG tablet, Take 1 tablet by mouth Daily., Disp: 90 tablet, Rfl: 2    hydrocortisone 2.5 % lotion, Apply 1 application  topically to the appropriate area as directed As Needed., Disp: , Rfl:     levocetirizine (XYZAL) 5 MG tablet, Take 1 tablet by mouth Every Other Day. Evening, Disp: , Rfl:     levothyroxine (SYNTHROID, LEVOTHROID) 50 MCG tablet, TAKE 1 TABLET EVERY MORNING, Disp: 30 tablet, Rfl: 0    Lutein 6 MG capsule, 18 mg., Disp: , Rfl:     LUTEIN-ZEAXANTHIN-BILBERRY PO, Take 1 tablet by mouth Daily., Disp: , Rfl:     metoprolol tartrate (LOPRESSOR) 25 MG tablet, Take 1 tablet by mouth 2 (Two) Times a Day., Disp: 180 tablet, Rfl: 2    montelukast (SINGULAIR) 10 MG tablet, Take 1 tablet by mouth Every Other Day., Disp: , Rfl:     multivitamin with minerals tablet tablet, Take 1 tablet by mouth Daily., Disp: , Rfl:     mupirocin (BACTROBAN) 2 % cream, Apply 1 Application topically to the appropriate area as directed As Needed., Disp: , Rfl:     omeprazole (priLOSEC) 20 MG capsule, TAKE 1 CAPSULE BY MOUTH EVERY DAY, Disp: 90 capsule, Rfl: 0    vitamin D (ERGOCALCIFEROL) 1.25 MG (85935 UT) capsule capsule, Take 1 capsule by mouth Every 7 (Seven) Days., Disp: , Rfl:     Rimegepant Sulfate (Nurtec) 75 MG tablet dispersible tablet, Take 1 tablet by mouth Daily As Needed  "(Migraine)., Disp: 9 tablet, Rfl: 3    traMADol (ULTRAM) 50 MG tablet, Take 1 tablet by mouth Daily., Disp: , Rfl:     valACYclovir (VALTREX) 1000 MG tablet, Take 1 tablet by mouth As Needed. (Patient not taking: Reported on 3/7/2024), Disp: , Rfl:     Allergies:   Allergies   Allergen Reactions    Codeine Nausea And Vomiting    Levofloxacin GI Intolerance    Lisinopril Cough       Objective     Physical Exam:  Vital Signs:   Vitals:    03/07/24 0852   BP: 110/62   BP Location: Right arm   Patient Position: Sitting   Cuff Size: Adult   Pulse: 75   Resp: 14   Temp: 97.8 °F (36.6 °C)   TempSrc: Infrared   SpO2: 98%   Weight: 78.7 kg (173 lb 9.6 oz)   Height: 165.1 cm (65\")   PainSc: 0-No pain     Body mass index is 28.89 kg/m².    Physical Exam  Vitals and nursing note reviewed.   Constitutional:       General: She is not in acute distress.     Appearance: Normal appearance. She is normal weight.   HENT:      Head: Normocephalic.      Nose: Nose normal.      Mouth/Throat:      Mouth: Mucous membranes are moist.      Pharynx: Oropharynx is clear.   Eyes:      Extraocular Movements: Extraocular movements intact.      Conjunctiva/sclera: Conjunctivae normal.   Musculoskeletal:         General: Normal range of motion.      Cervical back: Normal range of motion and neck supple. No rigidity.   Skin:     General: Skin is warm and dry.      Capillary Refill: Capillary refill takes less than 2 seconds.   Neurological:      Mental Status: She is alert and oriented to person, place, and time.      Cranial Nerves: Cranial nerves 2-12 are intact.      Coordination: Romberg Test normal.      Gait: Gait is intact.      Deep Tendon Reflexes:      Reflex Scores:       Tricep reflexes are 2+ on the right side and 2+ on the left side.       Bicep reflexes are 2+ on the right side and 2+ on the left side.       Patellar reflexes are 2+ on the right side and 2+ on the left side.  Psychiatric:         Mood and Affect: Mood normal.         " Speech: Speech normal.         Behavior: Behavior normal.         Thought Content: Thought content normal.         Judgment: Judgment normal.         Neurologic Exam     Mental Status   Oriented to person, place, and time.   Oriented to country, city, area, street and number.   Oriented to year, month, date, day and season.   Recall at 5 minutes: recalls 2 of 3 objects. Follows 3 step commands.   Attention: normal. Concentration: normal.   Speech: speech is normal   Level of consciousness: alert  Knowledge: good. Able to perform simple calculations.   Able to name object. Able to read. Able to repeat. Able to write. Normal comprehension.     Cranial Nerves   Cranial nerves II through XII intact.     Motor Exam   Muscle bulk: normal  Overall muscle tone: normal    Sensory Exam   Light touch normal.     Gait, Coordination, and Reflexes     Gait  Gait: normal    Coordination   Romberg: negative    Tremor   Resting tremor: absent    Reflexes   Right biceps: 2+  Left biceps: 2+  Right triceps: 2+  Left triceps: 2+  Right patellar: 2+  Left patellar: 2+  Right Wynne: absent  Left Wynne: absent       Assessment / Plan      Assessment/Plan:   Diagnoses and all orders for this visit:    1. Subjective memory complaints (Primary)  -     Cancel: MRI angiogram head w wo contrast; Future  -     donepezil (ARICEPT) 10 MG tablet; Take 1 tablet by mouth Every Night.  Dispense: 30 tablet; Refill: 3  -     MRI Angiogram Neck With & Without Contrast; Future    2. Acute migraine  -     Rimegepant Sulfate (Nurtec) 75 MG tablet dispersible tablet; Take 1 tablet by mouth Daily As Needed (Migraine).  Dispense: 9 tablet; Refill: 3    3. Moderate recurrent major depression  -     DULoxetine (CYMBALTA) 60 MG capsule; Take 1 capsule by mouth Daily.  Dispense: 90 capsule; Refill: 1    4. BENI (obstructive sleep apnea)         Follow Up:   Return in about 3 months (around 6/7/2024), or if symptoms worsen or fail to improve.    Anticipatory  Guidance and Safety Reviewed  Patient education provided  Reviewed MRI and CDUS and discussed; pt with concern regarding anomaly seen by US tech and is requesting additional imaging.   MRA NECK W/WO   Reviewed Sleep Study and discussed  Begin AutoPap therapy with care set faxed to [facility]  Continue Duloxetine 60 mg Daily; SE reviewed  Continue and increase Aricept 10 mg QHS; SE reviewed   MMSE 29/30  Nurtec 75 mg x 1 now in office for migraine; SE reviewed. Sample provided.     RTC PRN or within 12 weeks or sooner with issues    Nicolasa Broderick, DNP, APRN, FNP-C  [facility]

## 2024-03-07 NOTE — TELEPHONE ENCOUNTER
----- Message from CALI Sheikh sent at 3/7/2024 12:27 PM EST -----  Regarding: MRI results  Please call Radha with her MRI brain results; her MRI showed frontal predominant subcortical and periventricular white matter changes. These can be seen as the sequela of migraine HA, chronic small vessel ischemia and less likely demyelination. This is great news and I look forward to discussing these results with her further at her FU appt.

## 2024-03-20 ENCOUNTER — OFFICE VISIT (OUTPATIENT)
Dept: FAMILY MEDICINE CLINIC | Facility: CLINIC | Age: 64
End: 2024-03-20
Payer: MEDICARE

## 2024-03-20 VITALS
DIASTOLIC BLOOD PRESSURE: 80 MMHG | SYSTOLIC BLOOD PRESSURE: 130 MMHG | OXYGEN SATURATION: 98 % | HEART RATE: 62 BPM | BODY MASS INDEX: 28.32 KG/M2 | HEIGHT: 65 IN | WEIGHT: 170 LBS

## 2024-03-20 DIAGNOSIS — R73.9 HYPERGLYCEMIA: ICD-10-CM

## 2024-03-20 DIAGNOSIS — R42 DIZZINESS: ICD-10-CM

## 2024-03-20 DIAGNOSIS — E55.9 VITAMIN D DEFICIENCY: ICD-10-CM

## 2024-03-20 DIAGNOSIS — R53.1 WEAKNESS: Primary | ICD-10-CM

## 2024-03-20 DIAGNOSIS — I10 PRIMARY HYPERTENSION: ICD-10-CM

## 2024-03-20 DIAGNOSIS — R53.82 CHRONIC FATIGUE: ICD-10-CM

## 2024-03-20 DIAGNOSIS — Z00.00 ENCOUNTER FOR SUBSEQUENT ANNUAL WELLNESS VISIT (AWV) IN MEDICARE PATIENT: ICD-10-CM

## 2024-03-20 DIAGNOSIS — E03.9 HYPOTHYROIDISM, UNSPECIFIED TYPE: ICD-10-CM

## 2024-03-20 LAB
ALBUMIN SERPL-MCNC: 4.1 G/DL (ref 3.5–5.2)
ALBUMIN/GLOB SERPL: 2.2 G/DL
ALP SERPL-CCNC: 88 U/L (ref 39–117)
ALT SERPL W P-5'-P-CCNC: 31 U/L (ref 1–33)
ANION GAP SERPL CALCULATED.3IONS-SCNC: 6.8 MMOL/L (ref 5–15)
AST SERPL-CCNC: 21 U/L (ref 1–32)
BASOPHILS # BLD AUTO: 0.06 10*3/MM3 (ref 0–0.2)
BASOPHILS NFR BLD AUTO: 1.1 % (ref 0–1.5)
BILIRUB SERPL-MCNC: 0.4 MG/DL (ref 0–1.2)
BUN SERPL-MCNC: 12 MG/DL (ref 8–23)
BUN/CREAT SERPL: 11.9 (ref 7–25)
CALCIUM SPEC-SCNC: 9.2 MG/DL (ref 8.6–10.5)
CHLORIDE SERPL-SCNC: 104 MMOL/L (ref 98–107)
CHOLEST SERPL-MCNC: 154 MG/DL (ref 0–200)
CO2 SERPL-SCNC: 31.2 MMOL/L (ref 22–29)
CREAT SERPL-MCNC: 1.01 MG/DL (ref 0.57–1)
DEPRECATED RDW RBC AUTO: 47.6 FL (ref 37–54)
EGFRCR SERPLBLD CKD-EPI 2021: 61.5 ML/MIN/1.73
EOSINOPHIL # BLD AUTO: 0.22 10*3/MM3 (ref 0–0.4)
EOSINOPHIL NFR BLD AUTO: 3.9 % (ref 0.3–6.2)
ERYTHROCYTE [DISTWIDTH] IN BLOOD BY AUTOMATED COUNT: 14.1 % (ref 12.3–15.4)
EXPIRATION DATE: NORMAL
FLUAV AG UPPER RESP QL IA.RAPID: NOT DETECTED
FLUBV AG UPPER RESP QL IA.RAPID: NOT DETECTED
GLOBULIN UR ELPH-MCNC: 1.9 GM/DL
GLUCOSE SERPL-MCNC: 121 MG/DL (ref 65–99)
HCT VFR BLD AUTO: 42.2 % (ref 34–46.6)
HDLC SERPL-MCNC: 49 MG/DL (ref 40–60)
HGB BLD-MCNC: 13.4 G/DL (ref 12–15.9)
IMM GRANULOCYTES # BLD AUTO: 0.03 10*3/MM3 (ref 0–0.05)
IMM GRANULOCYTES NFR BLD AUTO: 0.5 % (ref 0–0.5)
INTERNAL CONTROL: NORMAL
LDLC SERPL CALC-MCNC: 81 MG/DL (ref 0–100)
LDLC/HDLC SERPL: 1.6 {RATIO}
LYMPHOCYTES # BLD AUTO: 1.18 10*3/MM3 (ref 0.7–3.1)
LYMPHOCYTES NFR BLD AUTO: 20.9 % (ref 19.6–45.3)
Lab: NORMAL
MCH RBC QN AUTO: 29.1 PG (ref 26.6–33)
MCHC RBC AUTO-ENTMCNC: 31.8 G/DL (ref 31.5–35.7)
MCV RBC AUTO: 91.7 FL (ref 79–97)
MONOCYTES # BLD AUTO: 0.6 10*3/MM3 (ref 0.1–0.9)
MONOCYTES NFR BLD AUTO: 10.6 % (ref 5–12)
NEUTROPHILS NFR BLD AUTO: 3.56 10*3/MM3 (ref 1.7–7)
NEUTROPHILS NFR BLD AUTO: 63 % (ref 42.7–76)
NRBC BLD AUTO-RTO: 0 /100 WBC (ref 0–0.2)
PLATELET # BLD AUTO: 318 10*3/MM3 (ref 140–450)
PMV BLD AUTO: 10.8 FL (ref 6–12)
POTASSIUM SERPL-SCNC: 4 MMOL/L (ref 3.5–5.2)
PROT SERPL-MCNC: 6 G/DL (ref 6–8.5)
RBC # BLD AUTO: 4.6 10*6/MM3 (ref 3.77–5.28)
SARS-COV-2 AG UPPER RESP QL IA.RAPID: NOT DETECTED
SODIUM SERPL-SCNC: 142 MMOL/L (ref 136–145)
TRIGL SERPL-MCNC: 134 MG/DL (ref 0–150)
TSH SERPL DL<=0.05 MIU/L-ACNC: 2.58 UIU/ML (ref 0.27–4.2)
VLDLC SERPL-MCNC: 24 MG/DL (ref 5–40)
WBC NRBC COR # BLD AUTO: 5.65 10*3/MM3 (ref 3.4–10.8)

## 2024-03-20 PROCEDURE — 82607 VITAMIN B-12: CPT | Performed by: NURSE PRACTITIONER

## 2024-03-20 PROCEDURE — 80061 LIPID PANEL: CPT | Performed by: NURSE PRACTITIONER

## 2024-03-20 PROCEDURE — 82306 VITAMIN D 25 HYDROXY: CPT | Performed by: NURSE PRACTITIONER

## 2024-03-20 PROCEDURE — 85025 COMPLETE CBC W/AUTO DIFF WBC: CPT | Performed by: NURSE PRACTITIONER

## 2024-03-20 PROCEDURE — 80053 COMPREHEN METABOLIC PANEL: CPT | Performed by: NURSE PRACTITIONER

## 2024-03-20 PROCEDURE — 84443 ASSAY THYROID STIM HORMONE: CPT | Performed by: NURSE PRACTITIONER

## 2024-03-20 RX ORDER — AZELASTINE 1 MG/ML
1 SPRAY, METERED NASAL ONCE
COMMUNITY

## 2024-03-20 NOTE — PROGRESS NOTES
"Chief Complaint  Fatigue (Patient wanting to discus medications , she has been feeling out of energy and sleeping more then normal  feeling weak , dizziness, upset stomach. )    Subjective        Radha Villa presents to [facility] for acute care (fatigue).    Fatigue  This is a recurrent problem. The current episode started more than 1 month ago. The problem occurs daily. The problem has been gradually worsening. Associated symptoms include fatigue and weakness. Pertinent negatives include no abdominal pain, anorexia, arthralgias, change in bowel habit, chest pain, chills, congestion, coughing, diaphoresis, fever, headaches, joint swelling, myalgias, nausea, neck pain, numbness, rash, sore throat, swollen glands, urinary symptoms, vertigo, visual change or vomiting. Exacerbated by: ?increase in cymbalta; ?increase in aricept. She has tried rest and sleep for the symptoms. The treatment provided no relief.     Objective   Vital Signs:  /80   Pulse 62   Ht 165.1 cm (65\")   Wt 77.1 kg (170 lb)   SpO2 98%   BMI 28.29 kg/m²   Estimated body mass index is 28.29 kg/m² as calculated from the following:    Height as of this encounter: 165.1 cm (65\").    Weight as of this encounter: 77.1 kg (170 lb).       Physical Exam  Vitals and nursing note reviewed.   Constitutional:       General: She is awake.      Appearance: Normal appearance.   HENT:      Head: Normocephalic.      Right Ear: Hearing and external ear normal.      Left Ear: Hearing and external ear normal.      Nose: Nose normal.      Mouth/Throat:      Lips: Pink.      Mouth: Mucous membranes are moist.   Eyes:      General: Lids are normal.      Conjunctiva/sclera: Conjunctivae normal.      Pupils: Pupils are equal, round, and reactive to light.   Cardiovascular:      Rate and Rhythm: Normal rate.   Pulmonary:      Effort: Pulmonary effort is normal.   Abdominal:      General: Abdomen is protuberant. Bowel sounds are normal. "      Palpations: Abdomen is soft.      Tenderness: There is no abdominal tenderness.   Musculoskeletal:         General: Normal range of motion.      Cervical back: Normal range of motion.   Skin:     General: Skin is warm and dry.      Capillary Refill: Capillary refill takes less than 2 seconds.   Neurological:      Mental Status: She is alert and oriented to person, place, and time.      Sensory: Sensation is intact.      Motor: Motor function is intact.      Coordination: Coordination is intact.      Gait: Gait is intact.   Psychiatric:         Attention and Perception: Attention and perception normal.         Mood and Affect: Affect normal. Mood is anxious.         Speech: Speech normal.         Behavior: Behavior normal. Behavior is cooperative.         Thought Content: Thought content normal.         Cognition and Memory: Cognition normal.         Judgment: Judgment normal.          Result Review :    The following data was reviewed by: CALI Lake on 03/20/2024:  CMP          12/5/2023    07:21 3/5/2024    07:52 3/20/2024    13:41   CMP   Glucose   121    BUN   12    Creatinine   1.01    EGFR  Am 56     62        EGFR   61.5    Sodium   142    Potassium   4.0    Chloride   104    Calcium   9.2    Total Protein   6.0    Albumin   4.1    Globulin   1.9    Total Bilirubin   0.4    Alkaline Phosphatase   88    AST (SGOT)   21    ALT (SGPT)   31    Albumin/Globulin Ratio   2.2    BUN/Creatinine Ratio   11.9    Anion Gap   6.8       Details          This result is from an external source.             CBC w/diff          7/7/2023    11:31 3/5/2024    10:32 3/20/2024    13:41   CBC w/Diff   WBC 9.36  6.47     5.65    RBC 5.12  4.60     4.60    Hemoglobin 14.3  13.5     13.4    Hematocrit 45.7  40.6     42.2    MCV 89.3  88     91.7    MCH 27.9  29.3     29.1    MCHC 31.3  33.3     31.8    RDW 14.2  14.4     14.1    Platelets 291  246     318    Neutrophil Rel % 61.4   63.0    Immature Granulocyte Rel  % 1.1   0.5    Lymphocyte Rel % 20.1   20.9    Monocyte Rel % 11.1   10.6    Eosinophil Rel % 5.4   3.9    Basophil Rel % 0.9   1.1       Details          This result is from an external source.             Lipid Panel          7/7/2023    11:31 3/20/2024    13:41   Lipid Panel   Total Cholesterol 188  154    Triglycerides 177  134    HDL Cholesterol 75  49    VLDL Cholesterol 29  24    LDL Cholesterol  84  81    LDL/HDL Ratio 1.03  1.60      TSH          7/7/2023    11:31 3/20/2024    13:41   TSH   TSH 1.500  2.580      Assessment and Plan     Diagnoses and all orders for this visit:    1. Weakness (Primary)  Comments:  symptomatic/supportive care; pt advised to f/u with neuro regarding SE of new medication changes  Orders:  -     POCT SARS-CoV-2 Antigen ZACHARIAH + Flu    2. Dizziness  Comments:  symptomatic/supportive care; pt advised to f/u with neuro regarding SE of new medication changes  Orders:  -     POCT SARS-CoV-2 Antigen ZACHARIAH + Flu    3. Encounter for subsequent annual wellness visit (AWV) in Medicare patient  Comments:  continue current plan of care  Orders:  -     CBC w AUTO Differential  -     Comprehensive metabolic panel  -     Lipid panel  -     TSH    4. Primary hypertension  -     CBC w AUTO Differential  -     Comprehensive metabolic panel  -     Lipid panel  -     TSH    5. Chronic fatigue  -     CBC w AUTO Differential  -     TSH  -     Vitamin B12    6. Hyperglycemia  -     Comprehensive metabolic panel    7. Hypothyroidism, unspecified type  -     TSH    8. Vitamin D deficiency  -     Vitamin D,25-Hydroxy         I spent 20 minutes caring for Radha on this date of service. This time includes time spent by me in the following activities:preparing for the visit, reviewing tests, obtaining and/or reviewing a separately obtained history, performing a medically appropriate examination and/or evaluation , counseling and educating the patient/family/caregiver, ordering medications, tests, or procedures,  referring and communicating with other health care professionals , documenting information in the medical record, independently interpreting results and communicating that information with the patient/family/caregiver, and care coordination    Follow Up     Return if symptoms worsen or fail to improve.  Patient was given instructions and counseling regarding her condition or for health maintenance advice. Please see specific information pulled into the AVS if appropriate.         This document has been electronically signed by CALI Lake  March 21, 2024 06:25 EDT

## 2024-03-21 LAB
25(OH)D3 SERPL-MCNC: 42.4 NG/ML (ref 30–100)
VIT B12 BLD-MCNC: 239 PG/ML (ref 211–946)

## 2024-03-22 ENCOUNTER — TELEPHONE (OUTPATIENT)
Dept: NEUROLOGY | Facility: CLINIC | Age: 64
End: 2024-03-22

## 2024-03-22 NOTE — TELEPHONE ENCOUNTER
Caller: Radha Villa    Relationship: Self    Best call back number: [phone]    Which medication are you concerned about: ARICEPT    Who prescribed you this medication: CALI SORTO    When did you start taking this medication: 3/8/24    What are your concerns: PATIENT ONLY WANTS TO SLEEP ALL THE TIME. SHE HAS NO ENERGY AT ALL, DIZZINESS, LIGHT HEADED, AND A LITTLE CONFUSION. SHE WANTS TO KNOW IF THIS COULD BE A REACTION TO THIS MEDICATION? HOW SHOULD SHE PROCEED?    How long have you had these concerns: ABOUT A WEEK BUT WORSENING

## 2024-03-25 NOTE — TELEPHONE ENCOUNTER
Called and spoke to Radha. She said that she saw her PCP on Friday, and she is no longer having those symptoms. She does not think it was the donepezil.

## 2024-03-28 RX ORDER — LEVOTHYROXINE SODIUM 0.05 MG/1
50 TABLET ORAL EVERY MORNING
Qty: 30 TABLET | Refills: 0 | Status: SHIPPED | OUTPATIENT
Start: 2024-03-28

## 2024-03-28 NOTE — TELEPHONE ENCOUNTER
Caller: Radha Villa    Relationship: Self    Best call back number:     Requested Prescriptions:   Requested Prescriptions     Pending Prescriptions Disp Refills    levothyroxine (SYNTHROID, LEVOTHROID) 50 MCG tablet 30 tablet 0     Sig: Take 1 tablet by mouth Every Morning.        Pharmacy where request should be sent: [facility] [address] - [phone]  - [phone]     Last office visit with prescribing clinician: 3/20/2024   Last telemedicine visit with prescribing clinician: Visit date not found   Next office visit with prescribing clinician: 7/10/2024     Additional details provided by patient:  PATIENT IS GOING OUT OF TOWN ON SUNDAY AND NEEDS A TEMPORARY SUPPLY OF ABOUT 5 SENT TO THE PHARMACY AS [facility] WONT HAVE HER MAIL ORDER HERE BY THEN    Does the patient have less than a 3 day supply:  [x] Yes  [] No        Maikel Purvis Rep   03/28/24 12:56 EDT

## 2024-04-02 DIAGNOSIS — I10 PRIMARY HYPERTENSION: ICD-10-CM

## 2024-04-09 RX ORDER — AMLODIPINE BESYLATE 5 MG/1
TABLET ORAL
Qty: 90 TABLET | Refills: 1 | Status: SHIPPED | OUTPATIENT
Start: 2024-04-09

## 2024-04-10 ENCOUNTER — HOSPITAL ENCOUNTER (OUTPATIENT)
Dept: MRI IMAGING | Facility: HOSPITAL | Age: 64
Discharge: HOME | End: 2024-04-10
Admitting: NURSE PRACTITIONER
Payer: MEDICARE

## 2024-04-10 DIAGNOSIS — R41.89 SUBJECTIVE MEMORY COMPLAINTS: ICD-10-CM

## 2024-04-10 PROCEDURE — 70549 MR ANGIOGRAPH NECK W/O&W/DYE: CPT

## 2024-04-10 PROCEDURE — A9577 INJ MULTIHANCE: HCPCS | Performed by: NURSE PRACTITIONER

## 2024-04-10 PROCEDURE — 0 GADOBENATE DIMEGLUMINE 529 MG/ML SOLUTION: Performed by: NURSE PRACTITIONER

## 2024-04-10 RX ADMIN — GADOBENATE DIMEGLUMINE 15 ML: 529 INJECTION, SOLUTION INTRAVENOUS at 10:55

## 2024-04-24 ENCOUNTER — OFFICE VISIT (OUTPATIENT)
Dept: NEUROLOGY | Facility: CLINIC | Age: 64
End: 2024-04-24
Payer: MEDICARE

## 2024-04-24 VITALS
BODY MASS INDEX: 28.42 KG/M2 | RESPIRATION RATE: 14 BRPM | TEMPERATURE: 98.2 F | HEART RATE: 61 BPM | OXYGEN SATURATION: 98 % | HEIGHT: 65 IN | WEIGHT: 170.6 LBS | SYSTOLIC BLOOD PRESSURE: 126 MMHG | DIASTOLIC BLOOD PRESSURE: 66 MMHG

## 2024-04-24 DIAGNOSIS — G43.909 ACUTE MIGRAINE: ICD-10-CM

## 2024-04-24 DIAGNOSIS — F33.1 MODERATE RECURRENT MAJOR DEPRESSION: ICD-10-CM

## 2024-04-24 DIAGNOSIS — R41.89 SUBJECTIVE MEMORY COMPLAINTS: Primary | ICD-10-CM

## 2024-04-24 RX ORDER — DULOXETIN HYDROCHLORIDE 60 MG/1
60 CAPSULE, DELAYED RELEASE ORAL DAILY
Qty: 90 CAPSULE | Refills: 1 | Status: SHIPPED | OUTPATIENT
Start: 2024-04-24

## 2024-04-24 RX ORDER — RIMEGEPANT SULFATE 75 MG/75MG
75 TABLET, ORALLY DISINTEGRATING ORAL DAILY PRN
Qty: 9 TABLET | Refills: 3 | Status: SHIPPED | OUTPATIENT
Start: 2024-04-24

## 2024-04-24 RX ORDER — DONEPEZIL HYDROCHLORIDE 10 MG/1
10 TABLET, FILM COATED ORAL NIGHTLY
Qty: 30 TABLET | Refills: 3 | Status: SHIPPED | OUTPATIENT
Start: 2024-04-24

## 2024-04-24 NOTE — PROGRESS NOTES
Follow Up Office Visit      Patient Name: Radha Villa  : 1960   MRN: [id number]     Chief Complaint:    Chief Complaint   Patient presents with    Follow-up     Memory- much better  Migraine; pt reports  HA days, pt thinks migraines were caused by retina tear in left eye.  BENI       History of Present Illness: Radha Villa is a 64 y.o. female who is here today to follow up with neurology for subjective memory impairment, acute migraine, BENI. She has been taking Aricept 10 mg Daily with good tolerance and compliance. For migraines she has been using Nurtec 75 mg PRN for rescue agent with good results. MDM . For prevention of migraines and depression, she is using Duloxetine 60 mg Daily.  She reports good tolerance and compliance with AutoPap therapy with improved BENI and fatigue; feels sharper.  No issues.  She is driving.  Self ADLs. AutoPap compliance report 30 out of 30 days or 100% compliance.  4 to 20 cm of water pressure.  Average a AHI 3.2. [employer] [facility].     Recent Imaging:     MRA Neck W/WO  - noncontributory   MRI Brain  + Frontal predominant subcortical and periventricular white matter changes, which can be seen as the sequela of migraine HA, chronic small vessel ischemia, or less likely demyelination.   Home Sleep Study  + AHI 12.2 Mild BENI  CDUS  - noncontributory      Pertinent Medical History: OA, PreDM, HL, HTN, hypothyroidism, Vitamin D Deficiency, Chronic Neck Pain ([facility]), Carcinoid Tumor ([facility]), Chronic-D, Retina Detachment Left Eye    Subjective      Review of Systems:   Review of Systems   Constitutional: Negative.  Negative for fatigue.   HENT: Negative.     Eyes: Negative.    Respiratory: Negative.     Cardiovascular: Negative.    Gastrointestinal: Negative.    Endocrine: Negative.    Genitourinary: Negative.    Musculoskeletal: Negative.    Skin: Negative.    Allergic/Immunologic: Negative.    Neurological:  Negative for headache and  memory problem.   Hematological: Negative.    Psychiatric/Behavioral:  Positive for sleep disturbance.    All other systems reviewed and are negative.      I have reviewed and the following portions of the patient's history were updated as appropriate: past family history, past medical history, past social history, past surgical history and problem list.    Medications:     Current Outpatient Medications:     acyclovir (ZOVIRAX) 5 % cream, Apply 1 Application topically to the appropriate area as directed As Needed., Disp: , Rfl:     albuterol (PROVENTIL) (2.5 MG/3ML) 0.083% nebulizer solution, Take 2.5 mg by nebulization As Needed., Disp: , Rfl:     amLODIPine (NORVASC) 5 MG tablet, TAKE 1/2 TABLET BY MOUTH IN THE MORNING. MAY TAKE EXTRA 1/2 TABLET AT NIGHT IF NEEDED FOR INCREASED FOR BLOOD PRESSURE > 150/90, Disp: 90 tablet, Rfl: 1    atorvastatin (LIPITOR) 10 MG tablet, Take 1 tablet by mouth Every Night., Disp: 90 tablet, Rfl: 2    azelastine (ASTELIN) 0.1 % nasal spray, 1 spray into the nostril(s) as directed by provider 1 (One) Time. Use in each nostril as directed, Disp: , Rfl:     calcipotriene (DOVONEX) 0.005 % cream, As Needed., Disp: , Rfl:     Cholecalciferol (Vitamin D3) 1.25 MG (82483 UT) tablet, Take 1 tablet by mouth 1 (One) Time Per Week., Disp: , Rfl:     dicyclomine (BENTYL) 10 MG capsule, Take 1 capsule by mouth 2 (Two) Times a Day. (Patient taking differently: Take 1 capsule by mouth Daily.), Disp: 60 capsule, Rfl: 3    donepezil (ARICEPT) 10 MG tablet, Take 1 tablet by mouth Every Night., Disp: 30 tablet, Rfl: 3    DULoxetine (CYMBALTA) 60 MG capsule, Take 1 capsule by mouth Daily., Disp: 90 capsule, Rfl: 1    ELDERBERRY PO, Take  by mouth As Needed., Disp: , Rfl:     EPINEPHrine (EPIPEN) 0.3 MG/0.3ML solution auto-injector injection, Inject 0.3 mL under the skin into the appropriate area as directed 1 (One) Time., Disp: , Rfl:     estradiol (ESTRACE) 0.1 MG/GM vaginal cream, , Disp: , Rfl:      famotidine (PEPCID) 20 MG tablet, Take 1 tablet by mouth 2 (Two) Times a Day., Disp: , Rfl:     fluconazole (DIFLUCAN) 150 MG tablet, Take 1 tablet by mouth Daily., Disp: , Rfl:     hydroCHLOROthiazide (HYDRODIURIL) 12.5 MG tablet, Take 1 tablet by mouth Daily., Disp: 90 tablet, Rfl: 2    hydrocortisone 2.5 % lotion, Apply 1 application  topically to the appropriate area as directed As Needed., Disp: , Rfl:     levocetirizine (XYZAL) 5 MG tablet, Take 1 tablet by mouth Every Other Day. Evening, Disp: , Rfl:     levothyroxine (SYNTHROID, LEVOTHROID) 50 MCG tablet, Take 1 tablet by mouth Every Morning., Disp: 30 tablet, Rfl: 0    Lutein 6 MG capsule, 18 mg., Disp: , Rfl:     LUTEIN-ZEAXANTHIN-BILBERRY PO, Take 1 tablet by mouth Daily., Disp: , Rfl:     metoprolol tartrate (LOPRESSOR) 25 MG tablet, Take 1 tablet by mouth 2 (Two) Times a Day., Disp: 180 tablet, Rfl: 2    montelukast (SINGULAIR) 10 MG tablet, Take 1 tablet by mouth Every Other Day., Disp: , Rfl:     multivitamin with minerals tablet tablet, Take 1 tablet by mouth Daily., Disp: , Rfl:     mupirocin (BACTROBAN) 2 % cream, Apply 1 Application topically to the appropriate area as directed As Needed., Disp: , Rfl:     omeprazole (priLOSEC) 20 MG capsule, TAKE 1 CAPSULE BY MOUTH EVERY DAY, Disp: 90 capsule, Rfl: 0    Rimegepant Sulfate (Nurtec) 75 MG tablet dispersible tablet, Take 1 tablet by mouth Daily As Needed (Migraine)., Disp: 9 tablet, Rfl: 3    valACYclovir (VALTREX) 1000 MG tablet, Take 1 tablet by mouth As Needed., Disp: , Rfl:     vitamin D (ERGOCALCIFEROL) 1.25 MG (33589 UT) capsule capsule, Take 1 capsule by mouth Every 7 (Seven) Days., Disp: , Rfl:     Allergies:   Allergies   Allergen Reactions    Codeine Nausea And Vomiting    Levofloxacin GI Intolerance    Lisinopril Cough       Objective     Physical Exam:  Vital Signs:   Vitals:    04/24/24 0922   BP: 126/66   BP Location: Right arm   Patient Position: Sitting   Cuff Size: Adult   Pulse: 61  "  Resp: 14   Temp: 98.2 °F (36.8 °C)   TempSrc: Infrared   SpO2: 98%   Weight: 77.4 kg (170 lb 9.6 oz)   Height: 165.1 cm (65\")   PainSc:   3   PainLoc: Comment: head, right hip     Body mass index is 28.39 kg/m².    Physical Exam  Vitals and nursing note reviewed.   Constitutional:       Appearance: Normal appearance.   HENT:      Head: Normocephalic.      Nose: Nose normal.      Mouth/Throat:      Mouth: Mucous membranes are moist.      Pharynx: Oropharynx is clear.   Eyes:      Extraocular Movements: Extraocular movements intact.      Conjunctiva/sclera: Conjunctivae normal.   Musculoskeletal:      Cervical back: Normal range of motion and neck supple.   Skin:     General: Skin is warm and dry.      Capillary Refill: Capillary refill takes less than 2 seconds.   Neurological:      General: No focal deficit present.      Mental Status: She is alert and oriented to person, place, and time.      Cranial Nerves: Cranial nerves 2-12 are intact.      Gait: Gait is intact.   Psychiatric:         Mood and Affect: Mood normal.         Speech: Speech normal.         Behavior: Behavior normal.         Neurologic Exam     Mental Status   Oriented to person, place, and time.   Oriented to country, city, area, street and number.   Oriented to year, month, date, day and season.   Registration: recalls 3 of 3 objects. Recall at 5 minutes: recalls 3 of 3 objects. Follows 3 step commands.   Attention: normal. Concentration: normal.   Speech: speech is normal   Level of consciousness: alert  Knowledge: good. Able to perform simple calculations.   Able to name object. Able to read. Able to repeat. Able to write. Normal comprehension.     Cranial Nerves   Cranial nerves II through XII intact.     Motor Exam   Muscle bulk: normal  Overall muscle tone: normal    Sensory Exam   Light touch normal.     Gait, Coordination, and Reflexes     Gait  Gait: normal    Tremor   Resting tremor: absent       Assessment / Plan      Assessment/Plan: "   Diagnoses and all orders for this visit:    1. Subjective memory complaints (Primary)  -     donepezil (ARICEPT) 10 MG tablet; Take 1 tablet by mouth Every Night.  Dispense: 30 tablet; Refill: 3    2. Moderate recurrent major depression  -     DULoxetine (CYMBALTA) 60 MG capsule; Take 1 capsule by mouth Daily.  Dispense: 90 capsule; Refill: 1    3. Acute migraine  -     Rimegepant Sulfate (Nurtec) 75 MG tablet dispersible tablet; Take 1 tablet by mouth Daily As Needed (Migraine).  Dispense: 9 tablet; Refill: 3         Follow Up:   Return in about 3 months (around 7/24/2024), or if symptoms worsen or fail to improve.    Anticipatory guidance and safety reviewed  Patient education provided  Continue Aricept 10 mg daily; side effects reviewed  Continue Cymbalta 60 mg (prevention); side effects reviewed  Continue Nurtec 75 mg (abortive); side effects reviewed  MMSE 30/30  BENI compliance Report reviewed (scanned).   Continue Autopap therapy    RTC PRN or within 6 months or sooner with issues     Nicolasa Broderick, DNP, APRN, FNP-C  [facility]

## 2024-07-15 DIAGNOSIS — F33.1 MODERATE RECURRENT MAJOR DEPRESSION: ICD-10-CM

## 2024-07-15 RX ORDER — DULOXETIN HYDROCHLORIDE 60 MG/1
60 CAPSULE, DELAYED RELEASE ORAL DAILY
Qty: 90 CAPSULE | Refills: 3 | OUTPATIENT
Start: 2024-07-15

## 2024-08-13 DIAGNOSIS — F33.1 MODERATE RECURRENT MAJOR DEPRESSION: ICD-10-CM

## 2024-08-13 NOTE — TELEPHONE ENCOUNTER
Caller: MANDY Mckeon call back number: [phone]    Requested Prescriptions:   Requested Prescriptions     Pending Prescriptions Disp Refills    DULoxetine (CYMBALTA) 60 MG capsule 90 capsule 1     Sig: Take 1 capsule by mouth Daily.        Pharmacy where request should be sent:  [facility]     Last office visit with prescribing clinician: 4/24/2024   Last telemedicine visit with prescribing clinician: Visit date not found   Next office visit with prescribing clinician: 10/23/2024     Additional details provided by patient: PT WILL RUN OUT OF MEDICATION ON FRIDAY    Does the patient have less than a 3 day supply:  [x] Yes  [] No        Maikel Abdul Rep   08/13/24 10:47 EDT

## 2024-08-14 RX ORDER — DULOXETIN HYDROCHLORIDE 60 MG/1
60 CAPSULE, DELAYED RELEASE ORAL DAILY
Qty: 90 CAPSULE | Refills: 1 | Status: SHIPPED | OUTPATIENT
Start: 2024-08-14

## 2024-08-30 DIAGNOSIS — I10 PRIMARY HYPERTENSION: ICD-10-CM

## 2024-08-30 DIAGNOSIS — R60.0 BILATERAL EDEMA OF LOWER EXTREMITY: ICD-10-CM

## 2024-08-30 RX ORDER — HYDROCHLOROTHIAZIDE 12.5 MG/1
12.5 TABLET ORAL DAILY
Qty: 90 TABLET | Refills: 3 | Status: SHIPPED | OUTPATIENT
Start: 2024-08-30

## 2024-08-30 RX ORDER — METOPROLOL TARTRATE 25 MG/1
25 TABLET, FILM COATED ORAL 2 TIMES DAILY
Qty: 180 TABLET | Refills: 3 | Status: SHIPPED | OUTPATIENT
Start: 2024-08-30

## 2024-08-30 NOTE — TELEPHONE ENCOUNTER
Patient called needing refills on HCTZ 12.5 mg daily and metoprolol 25 mg BID. 90 day supplies and 3 refills are pended to be sent to [facility].

## 2024-10-02 DIAGNOSIS — I10 PRIMARY HYPERTENSION: ICD-10-CM

## 2024-10-02 RX ORDER — AMLODIPINE BESYLATE 5 MG/1
TABLET ORAL
Qty: 90 TABLET | Refills: 0 | Status: SHIPPED | OUTPATIENT
Start: 2024-10-02

## 2024-10-02 NOTE — TELEPHONE ENCOUNTER
Rx Refill Note  Requested Prescriptions     Pending Prescriptions Disp Refills    amLODIPine (NORVASC) 5 MG tablet [Pharmacy Med Name: AMLODIPINE 5MG] 90 tablet 0     Sig: TAKE 1/2 TABLET BY MOUTH IN THE MORNING. MAY TAKE EXTRA 1/2 TABLET AT NIGHT IF NEEDED FOR INCREASED FOR BLOOD PRESSURE > 150/90      Last office visit with prescribing clinician: 1/17/2024   Last telemedicine visit with prescribing clinician: Visit date not found   Next office visit with prescribing clinician: 1/16/2025                         Would you like a call back once the refill request has been completed: [] Yes [] No    If the office needs to give you a call back, can they leave a voicemail: [] Yes [] No    Maria Esther Wehat CMA  10/02/24, 11:16 EDT

## 2024-10-07 DIAGNOSIS — R41.89 SUBJECTIVE MEMORY COMPLAINTS: ICD-10-CM

## 2024-10-07 RX ORDER — DONEPEZIL HYDROCHLORIDE 10 MG/1
10 TABLET, FILM COATED ORAL NIGHTLY
Qty: 30 TABLET | Refills: 2 | Status: SHIPPED | OUTPATIENT
Start: 2024-10-07

## 2024-10-23 ENCOUNTER — OFFICE VISIT (OUTPATIENT)
Dept: NEUROLOGY | Facility: CLINIC | Age: 64
End: 2024-10-23
Payer: MEDICARE

## 2024-10-23 VITALS
BODY MASS INDEX: 27.96 KG/M2 | OXYGEN SATURATION: 98 % | HEIGHT: 65 IN | SYSTOLIC BLOOD PRESSURE: 98 MMHG | WEIGHT: 167.8 LBS | DIASTOLIC BLOOD PRESSURE: 68 MMHG | TEMPERATURE: 97.3 F | RESPIRATION RATE: 14 BRPM | HEART RATE: 81 BPM

## 2024-10-23 DIAGNOSIS — G43.909 ACUTE MIGRAINE: ICD-10-CM

## 2024-10-23 DIAGNOSIS — G47.33 OSA (OBSTRUCTIVE SLEEP APNEA): ICD-10-CM

## 2024-10-23 DIAGNOSIS — R41.89 SUBJECTIVE MEMORY COMPLAINTS: Primary | ICD-10-CM

## 2024-10-23 DIAGNOSIS — F33.1 MODERATE RECURRENT MAJOR DEPRESSION: ICD-10-CM

## 2024-10-23 RX ORDER — DONEPEZIL HYDROCHLORIDE 10 MG/1
10 TABLET, FILM COATED ORAL NIGHTLY
Qty: 30 TABLET | Refills: 6 | Status: SHIPPED | OUTPATIENT
Start: 2024-10-23

## 2024-10-23 RX ORDER — DULOXETIN HYDROCHLORIDE 60 MG/1
60 CAPSULE, DELAYED RELEASE ORAL DAILY
Qty: 30 CAPSULE | Refills: 6 | Status: SHIPPED | OUTPATIENT
Start: 2024-10-23

## 2024-10-23 RX ORDER — MEMANTINE HYDROCHLORIDE 5 MG/1
5 TABLET ORAL DAILY
Qty: 30 TABLET | Refills: 6 | Status: SHIPPED | OUTPATIENT
Start: 2024-10-23 | End: 2025-10-23

## 2024-10-23 NOTE — PROGRESS NOTES
Follow Up Office Visit      Patient Name: Radha Villa  : 1960   MRN: [id number]     Chief Complaint:    Chief Complaint   Patient presents with    Follow-up     Memory  BENI; not using CPAP  Migraine; pt reports -10/30 HA days       History of Present Illness: Radha Villa is a 64 y.o. female who is here today to follow up with neurology for subjective memory impairment, acute migraine and BENI. She was last seen in clinic .     She has been taking Aricept 10 mg Daily with good tolerance and compliance. She is driving. Self ADLs. Continues to struggle with word finding, brain fog and misplacing items such as her car in the parking lot. She feels friends have noticed her memory changes.  Worries about her memory    For migraines she has been using Nurtec 75 mg PRN for rescue agent with good results. Insurance covered the medication and it is too expensive so she wants to DC. MDM . HDM -.  For prevention of migraines and depression, she is using Duloxetine 60 mg Daily. Likes to sleep her HA off in a cold dark room.  Using Tylenol PRN for HA.     Depression has been worse. No SI/HI.  Been taking duloxetine 60 mg daily as directed and reports good compliance.  Depression has been worse this fall in summer.  She was struggling to get dressed and to keep appointments and obligations with friends and family.  In the past month she has made a routine for herself, stopped her daytime napping, and is making herself get dressed and wear make-up every day for herself.  She does not want to go to counseling or therapy.  She is not interested in seeing a psychiatrist at this time.  No exercise.    She returned her Autopap. She could not sleep with the machine and gave up on autopap therapy after 90 days of use. She felt the machine irritated her and keep her awake. She has no issues staying asleep now-- issues falling asleep but admits this is due to napping during the day.    Recent Imaging:      MRA  Neck W/WO 04/24 - noncontributory   MRI Brain 02/24 + Frontal predominant subcortical and periventricular white matter changes, which can be seen as the sequela of migraine HA, chronic small vessel ischemia, or less likely demyelination.   Home Sleep Study 02/24 + AHI 12.2 Mild BENI  CDUS 02/24 - noncontributory      Pertinent Medical History: OA, PreDM, HL, HTN, hypothyroidism, Vitamin D Deficiency, Chronic Neck Pain ([place]), Carcinoid Tumor ([facility] @ [facility]), Chronic-D, Retina Detachment Left Eye    Subjective      Review of Systems:   Review of Systems   Constitutional: Negative.  Negative for fatigue.   HENT: Negative.     Eyes: Negative.    Respiratory: Negative.     Cardiovascular: Negative.    Gastrointestinal: Negative.    Endocrine: Negative.    Genitourinary: Negative.    Musculoskeletal: Negative.    Skin: Negative.    Allergic/Immunologic: Negative.    Neurological:  Positive for headache and memory problem.   Hematological: Negative.    Psychiatric/Behavioral:  Positive for sleep disturbance and depressed mood.    All other systems reviewed and are negative.      I have reviewed and the following portions of the patient's history were updated as appropriate: past family history, past medical history, past social history, past surgical history and problem list.    Medications:     Current Outpatient Medications:     acyclovir (ZOVIRAX) 5 % cream, Apply 1 Application topically to the appropriate area as directed As Needed., Disp: , Rfl:     albuterol (PROVENTIL) (2.5 MG/3ML) 0.083% nebulizer solution, Take 2.5 mg by nebulization As Needed., Disp: , Rfl:     amLODIPine (NORVASC) 5 MG tablet, TAKE 1/2 TABLET BY MOUTH IN THE MORNING. MAY TAKE EXTRA 1/2 TABLET AT NIGHT IF NEEDED FOR INCREASED FOR BLOOD PRESSURE > 150/90, Disp: 90 tablet, Rfl: 0    atorvastatin (LIPITOR) 10 MG tablet, Take 1 tablet by mouth Every Night., Disp: 90 tablet, Rfl: 2    azelastine (ASTELIN) 0.1 % nasal spray, Administer 1 spray into  the nostril(s) as directed by provider 1 (One) Time. Use in each nostril as directed, Disp: , Rfl:     calcipotriene (DOVONEX) 0.005 % cream, As Needed., Disp: , Rfl:     Cholecalciferol (Vitamin D3) 1.25 MG (19340 UT) tablet, Take 1 tablet by mouth 1 (One) Time Per Week., Disp: , Rfl:     dicyclomine (BENTYL) 10 MG capsule, Take 1 capsule by mouth 2 (Two) Times a Day. (Patient taking differently: Take 1 capsule by mouth Daily.), Disp: 60 capsule, Rfl: 3    donepezil (ARICEPT) 10 MG tablet, Take 1 tablet by mouth Every Night., Disp: 30 tablet, Rfl: 6    DULoxetine (CYMBALTA) 60 MG capsule, Take 1 capsule by mouth Daily., Disp: 30 capsule, Rfl: 6    ELDERBERRY PO, Take  by mouth As Needed., Disp: , Rfl:     EPINEPHrine (EPIPEN) 0.3 MG/0.3ML solution auto-injector injection, Inject 0.3 mL under the skin into the appropriate area as directed 1 (One) Time., Disp: , Rfl:     estradiol (ESTRACE) 0.1 MG/GM vaginal cream, , Disp: , Rfl:     famotidine (PEPCID) 20 MG tablet, Take 1 tablet by mouth 2 (Two) Times a Day., Disp: , Rfl:     fluconazole (DIFLUCAN) 150 MG tablet, Take 1 tablet by mouth Daily., Disp: , Rfl:     hydroCHLOROthiazide 12.5 MG tablet, Take 1 tablet by mouth Daily., Disp: 90 tablet, Rfl: 3    hydrocortisone 2.5 % lotion, Apply 1 application  topically to the appropriate area as directed As Needed., Disp: , Rfl:     levocetirizine (XYZAL) 5 MG tablet, Take 1 tablet by mouth Every Other Day. Evening, Disp: , Rfl:     levothyroxine (SYNTHROID, LEVOTHROID) 50 MCG tablet, Take 1 tablet by mouth Every Morning., Disp: 30 tablet, Rfl: 0    Lutein 6 MG capsule, 18 mg., Disp: , Rfl:     LUTEIN-ZEAXANTHIN-BILBERRY PO, Take 1 tablet by mouth Daily., Disp: , Rfl:     metoprolol tartrate (LOPRESSOR) 25 MG tablet, Take 1 tablet by mouth 2 (Two) Times a Day., Disp: 180 tablet, Rfl: 3    montelukast (SINGULAIR) 10 MG tablet, Take 1 tablet by mouth Every Other Day., Disp: , Rfl:     multivitamin with minerals tablet  "tablet, Take 1 tablet by mouth Daily., Disp: , Rfl:     mupirocin (BACTROBAN) 2 % cream, Apply 1 Application topically to the appropriate area as directed As Needed., Disp: , Rfl:     omeprazole (priLOSEC) 20 MG capsule, TAKE 1 CAPSULE BY MOUTH EVERY DAY, Disp: 90 capsule, Rfl: 0    valACYclovir (VALTREX) 1000 MG tablet, Take 1 tablet by mouth As Needed., Disp: , Rfl:     vitamin D (ERGOCALCIFEROL) 1.25 MG (46683 UT) capsule capsule, Take 1 capsule by mouth Every 7 (Seven) Days., Disp: , Rfl:     memantine (NAMENDA) 5 MG tablet, Take 1 tablet by mouth Daily., Disp: 30 tablet, Rfl: 6    Allergies:   Allergies   Allergen Reactions    Codeine Nausea And Vomiting    Levofloxacin GI Intolerance    Lisinopril Cough       Objective     Physical Exam:  Vital Signs:   Vitals:    10/23/24 1026   BP: 98/68   BP Location: Right arm   Patient Position: Sitting   Cuff Size: Adult   Pulse: 81   Resp: 14   Temp: 97.3 °F (36.3 °C)   TempSrc: Infrared   SpO2: 98%   Weight: 76.1 kg (167 lb 12.8 oz)   Height: 165.1 cm (65\")   PainSc:   4   PainLoc: Ear  Comment: right     Body mass index is 27.92 kg/m².    Physical Exam  Vitals and nursing note reviewed.   Constitutional:       General: She is not in acute distress.     Appearance: Normal appearance.   HENT:      Head: Normocephalic.      Nose: Nose normal.      Mouth/Throat:      Mouth: Mucous membranes are moist.      Pharynx: Oropharynx is clear.   Eyes:      Extraocular Movements: Extraocular movements intact.      Conjunctiva/sclera: Conjunctivae normal.   Musculoskeletal:      Cervical back: Normal range of motion and neck supple.   Skin:     General: Skin is warm and dry.      Capillary Refill: Capillary refill takes less than 2 seconds.   Neurological:      General: No focal deficit present.      Mental Status: She is alert and oriented to person, place, and time.   Psychiatric:         Mood and Affect: Mood normal.         Behavior: Behavior normal.             Assessment / " Plan      Assessment/Plan:   Diagnoses and all orders for this visit:    1. Subjective memory complaints (Primary)  -     donepezil (ARICEPT) 10 MG tablet; Take 1 tablet by mouth Every Night.  Dispense: 30 tablet; Refill: 6  -     memantine (NAMENDA) 5 MG tablet; Take 1 tablet by mouth Daily.  Dispense: 30 tablet; Refill: 6    2. Moderate recurrent major depression  -     DULoxetine (CYMBALTA) 60 MG capsule; Take 1 capsule by mouth Daily.  Dispense: 30 capsule; Refill: 6    3. Acute migraine    4. BENI (obstructive sleep apnea)         Follow Up:   Return in about 3 months (around 1/23/2025), or if symptoms worsen or fail to improve.    Anticipatory guidance and safety reviewed  Patient education provided  Continue Aricept 10 mg daily; side effects reviewed  Begin Namenda 5 mg daily; side effects reviewed  Continue Cymbalta 60 mg (prevention); side effects reviewed  Long discussion on mental health promotion strategies: Sleep hygiene, exercise greater than 150 minutes/week, positive social support, self-care, CBT counseling  Declined offer of referral to behavioral health for therapy and medication mgmt   [facility] - cost   Benefits of treating BENI with AutoPap therapy reviewed; risks discussed     RTC PRN or within 6 months or sooner with issues (MMSE at FU)    Nicolasa Broderick, DNP, APRN, FNP-C  [facility]

## 2024-12-02 DIAGNOSIS — E78.00 HYPERCHOLESTEREMIA: ICD-10-CM

## 2024-12-03 RX ORDER — ATORVASTATIN CALCIUM 10 MG/1
10 TABLET, FILM COATED ORAL NIGHTLY
Qty: 60 TABLET | Refills: 0 | Status: SHIPPED | OUTPATIENT
Start: 2024-12-03

## 2024-12-09 DIAGNOSIS — I10 PRIMARY HYPERTENSION: ICD-10-CM

## 2024-12-11 RX ORDER — AMLODIPINE BESYLATE 5 MG/1
TABLET ORAL
Qty: 90 TABLET | Refills: 0 | Status: SHIPPED | OUTPATIENT
Start: 2024-12-11

## 2024-12-19 ENCOUNTER — TELEPHONE (OUTPATIENT)
Dept: NEUROLOGY | Facility: CLINIC | Age: 64
End: 2024-12-19

## 2024-12-19 NOTE — TELEPHONE ENCOUNTER
PT CALLING TO LET PROVIDER KNOW THAT SHE DOES HAVE A TUMOR THAT HAS COME BACK.  SHE SAID YOU WANTED TO KNOW.

## 2025-01-06 DIAGNOSIS — I10 PRIMARY HYPERTENSION: ICD-10-CM

## 2025-01-07 RX ORDER — AMLODIPINE BESYLATE 5 MG/1
TABLET ORAL
Qty: 90 TABLET | Refills: 2 | Status: SHIPPED | OUTPATIENT
Start: 2025-01-07

## 2025-01-16 ENCOUNTER — OFFICE VISIT (OUTPATIENT)
Dept: CARDIOLOGY | Facility: CLINIC | Age: 65
End: 2025-01-16
Payer: MEDICARE

## 2025-01-16 VITALS
BODY MASS INDEX: 27.92 KG/M2 | HEIGHT: 65 IN | HEART RATE: 84 BPM | SYSTOLIC BLOOD PRESSURE: 130 MMHG | DIASTOLIC BLOOD PRESSURE: 70 MMHG

## 2025-01-16 DIAGNOSIS — E78.00 HYPERCHOLESTEREMIA: ICD-10-CM

## 2025-01-16 DIAGNOSIS — R60.0 BILATERAL EDEMA OF LOWER EXTREMITY: ICD-10-CM

## 2025-01-16 DIAGNOSIS — R00.2 PALPITATIONS: ICD-10-CM

## 2025-01-16 DIAGNOSIS — I10 PRIMARY HYPERTENSION: Primary | ICD-10-CM

## 2025-01-16 PROCEDURE — 3075F SYST BP GE 130 - 139MM HG: CPT | Performed by: NURSE PRACTITIONER

## 2025-01-16 PROCEDURE — 3078F DIAST BP <80 MM HG: CPT | Performed by: NURSE PRACTITIONER

## 2025-01-16 PROCEDURE — 99214 OFFICE O/P EST MOD 30 MIN: CPT | Performed by: NURSE PRACTITIONER

## 2025-01-16 PROCEDURE — 1160F RVW MEDS BY RX/DR IN RCRD: CPT | Performed by: NURSE PRACTITIONER

## 2025-01-16 PROCEDURE — 1159F MED LIST DOCD IN RCRD: CPT | Performed by: NURSE PRACTITIONER

## 2025-01-16 RX ORDER — METOPROLOL TARTRATE 25 MG/1
TABLET, FILM COATED ORAL
Qty: 90 TABLET | Refills: 3 | Status: SHIPPED | OUTPATIENT
Start: 2025-01-16

## 2025-01-16 RX ORDER — HYDROCHLOROTHIAZIDE 12.5 MG/1
TABLET ORAL
Start: 2025-01-16

## 2025-01-16 NOTE — PROGRESS NOTES
Chief Complaint   Patient presents with    Follow-up     Cardiac management . Had new diagnosis of maliginant carcinoid tumor intestine  had surgery 12-    LABS     Lipid panel March 2024    Med Refill     Patient had stopped taking most medications since discharge from hospital post surgery . She would like to discuss her medications and which ones she will need.       Subjective       Radha Villa is a 65 y.o. female with HTN, hypothyroidism, and hypercholesterolemia.  She was initially seen 2021 for cardiac evaluation of CP, dizziness, and SOA.  Dicyclomine added for GI symptoms.  Stress test revealed normal myocardial perfusion, echocardiogram showed mild LVH with EF around 60%, mild MR and TR.  RVSP was 33 mmHg.  14-day cardiac monitor showed baseline sinus rhythm, rare PACs/PVCs, One 4 beat run of VT and 18 beat run of SVT.  No pauses recorded.  Patient had CP and palpitations without EKG changes.  Overnight oxygen monitor was unremarkable.  In 2022 carotid ultrasound was negative for stenosis.  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     Today she returns to the office for a follow-up visit.  Recently she underwent surgery for removal of tumor from small intestines.  She will have follow-up visit with surgeon in near future.  After surgery she stopped several medications.  Recently she has noticed a slight increase in blood pressure and heart rate and is inquiring which medications to resume.    Cardiac History:    Past Surgical History:   Procedure Laterality Date    APPENDECTOMY      CARDIOVASCULAR STRESS TEST  04/11/2002    @ [facility].- 72% THR. EF 71%. Negative    CARDIOVASCULAR STRESS TEST  08/25/2014    Dr. Cantrell- 6 Min.30 Sec. 7.6 METS. 81% THR.EF 77%. Negative    CARDIOVASCULAR STRESS TEST  12/22/2021    6 Min. 7.00 METS. 85% THR. BP- 153/85. EF > 70%. Negative    CHOLECYSTECTOMY      COLON SURGERY       COLONOSCOPY      COLONOSCOPY N/A 09/13/2016    Procedure: COLONOSCOPY FOR SCREENING CPT CODE: ;  Surgeon: Milagros Trevizo DO;  Location: [facility] OR;  Service:     CONVERTED (HISTORICAL) HOLTER  01/10/2022    14 Days. Avg 87. . One run of VT & SVT.    DILATION AND CURETTAGE, DIAGNOSTIC / THERAPEUTIC      ECHO - CONVERTED  12/22/2021    EF 60%. Mild MR. RVSP- 34 mmHg    EYE SURGERY      HYSTERECTOMY      OTHER SURGICAL HISTORY  12/16/2021    Pulse O2- Normal    US CAROTID UNILATERAL Bilateral 12/14/2022    No acute findings in the neck       Current Outpatient Medications   Medication Sig Dispense Refill    acyclovir (ZOVIRAX) 5 % cream Apply 1 Application topically to the appropriate area as directed As Needed.      albuterol (PROVENTIL) (2.5 MG/3ML) 0.083% nebulizer solution Take 2.5 mg by nebulization As Needed.      azelastine (ASTELIN) 0.1 % nasal spray Administer 1 spray into the nostril(s) as directed by provider 1 (One) Time. Use in each nostril as directed      calcipotriene (DOVONEX) 0.005 % cream As Needed.      Cholecalciferol (Vitamin D3) 1.25 MG (90598 UT) tablet Take 1 tablet by mouth 1 (One) Time Per Week.      donepezil (ARICEPT) 10 MG tablet Take 1 tablet by mouth Every Night. 30 tablet 6    DULoxetine (CYMBALTA) 60 MG capsule Take 1 capsule by mouth Daily. 30 capsule 6    ELDERBERRY PO Take  by mouth As Needed.      EPINEPHrine (EPIPEN) 0.3 MG/0.3ML solution auto-injector injection Inject 0.3 mL under the skin into the appropriate area as directed 1 (One) Time.      estradiol (ESTRACE) 0.1 MG/GM vaginal cream       fluconazole (DIFLUCAN) 150 MG tablet Take 1 tablet by mouth Daily.      hydroCHLOROthiazide 12.5 MG tablet Once or Twice a day only as needed for increased swelling or increased BP      hydrocortisone 2.5 % lotion Apply 1 application  topically to the appropriate area as directed As Needed.      levocetirizine (XYZAL) 5 MG tablet Take 1 tablet by mouth Every Other Day.  Evening      levothyroxine (SYNTHROID, LEVOTHROID) 50 MCG tablet Take 1 tablet by mouth Every Morning. 30 tablet 0    LUTEIN-ZEAXANTHIN-BILBERRY PO Take 1 tablet by mouth Daily.      memantine (NAMENDA) 5 MG tablet Take 1 tablet by mouth Daily. 30 tablet 6    mupirocin (BACTROBAN) 2 % cream Apply 1 Application topically to the appropriate area as directed As Needed.      valACYclovir (VALTREX) 1000 MG tablet Take 1 tablet by mouth As Needed.      vitamin D (ERGOCALCIFEROL) 1.25 MG (78388 UT) capsule capsule Take 1 capsule by mouth Every 7 (Seven) Days.      dicyclomine (BENTYL) 10 MG capsule Take 1 capsule by mouth 2 (Two) Times a Day. (Patient taking differently: Take 1 capsule by mouth Daily.) 60 capsule 3    metoprolol tartrate (LOPRESSOR) 25 MG tablet 1/2 tablet in the morning and 1/2 tablet at night 90 tablet 3     No current facility-administered medications for this visit.       Codeine, Levofloxacin, and Lisinopril    Past Medical History:   Diagnosis Date    Alpha-galactosidase A deficiency     Arthritis     Borderline diabetic     Cancer     CARCINOID TUMOR    Carcinoid tumor     ileum    Diabetes mellitus     BORDERLINE    Fibromyalgia, primary     Headache, tension-type     Hyperlipidemia     Hypertension     Hypothyroidism     Hypothyroidism 12/14/2021    Memory loss     Migraine     PONV (postoperative nausea and vomiting)     Regurgitation     Shingles     Sleep apnea     Tachycardia     Vision loss     Vitamin D deficiency        Social History     Socioeconomic History    Marital status:    Tobacco Use    Smoking status: Never     Passive exposure: Past    Smokeless tobacco: Never   Vaping Use    Vaping status: Never Used   Substance and Sexual Activity    Alcohol use: No    Drug use: No    Sexual activity: Not Currently     Partners: Male     Birth control/protection: Post-menopausal, Hysterectomy       Family History   Problem Relation Age of Onset    Heart disease Mother     Atrial  "fibrillation Mother     Skin cancer Mother     Dementia Mother     Alzheimer's disease Maternal Aunt     Dementia Paternal Uncle     Heart disease Maternal Grandfather     Heart disease Paternal Grandmother     Heart attack Other     Diabetes Other     Stroke Other     Cancer Other     Heart disease Other         Cardiac disorder       Review of Systems   Cardiovascular:  Negative for chest pain, irregular heartbeat, leg swelling, near-syncope and palpitations.   Respiratory:  Negative for shortness of breath.    Hematologic/Lymphatic: Negative for bleeding problem.   Gastrointestinal:  Negative for melena.        S/p abdominal surgery   Genitourinary:  Negative for hematuria.   Neurological:  Negative for dizziness and weakness.        BP Readings from Last 5 Encounters:   01/16/25 130/70   10/23/24 98/68   04/24/24 126/66   03/20/24 130/80   03/07/24 110/62       Wt Readings from Last 5 Encounters:   10/23/24 76.1 kg (167 lb 12.8 oz)   04/24/24 77.4 kg (170 lb 9.6 oz)   03/20/24 77.1 kg (170 lb)   03/07/24 78.7 kg (173 lb 9.6 oz)   01/25/24 76 kg (167 lb 9.6 oz)       Objective     Labs 3/20/2024: , triglycerides 134, HDL 59, LDL 81, TSH 2.58, glucose 121, BUN 12, creatinine 1.01, sodium 142, potassium 4, chloride 104, CO2 31.2, calcium 9.2, total protein 6, Beman 4.1, ALT 31, AST 21, ALP 88, total bili 0.4, globulin 1.9, GFR 61.5, vitamin B12 239, vitamin D 42.4, CBC WNL      /70 (BP Location: Left arm, Patient Position: Sitting, Cuff Size: Adult)   Pulse 84   Ht 165.1 cm (65\")   BMI 27.92 kg/m²     Vitals and nursing note reviewed.   Constitutional:       Appearance: Not in distress.   Eyes:      Conjunctiva/sclera: Conjunctivae normal.      Pupils: Pupils are equal, round, and reactive to light.   HENT:      Head: Normocephalic.   Pulmonary:      Effort: Pulmonary effort is normal.      Breath sounds: Normal breath sounds.   Cardiovascular:      PMI at left midclavicular line. Normal rate. " Regular rhythm.      Murmurs: There is no murmur.   Edema:     Peripheral edema absent.   Abdominal:      Palpations: Abdomen is soft.      Tenderness: There is abdominal tenderness (Mild postop).   Musculoskeletal:      Cervical back: Neck supple. Skin:     General: Skin is warm and dry.   Neurological:      Mental Status: Alert, oriented to person, place, and time and oriented to person, place and time.   Psychiatric:         Mood and Affect: Mood normal.         Speech: Speech normal.          Procedures: None today         Assessment & Plan   Diagnoses and all orders for this visit:    1. Primary hypertension (Primary)  -     metoprolol tartrate (LOPRESSOR) 25 MG tablet; 1/2 tablet in the morning and 1/2 tablet at night  Dispense: 90 tablet; Refill: 3  -     hydroCHLOROthiazide 12.5 MG tablet; Once or Twice a day only as needed for increased swelling or increased BP    2. Palpitations  -     metoprolol tartrate (LOPRESSOR) 25 MG tablet; 1/2 tablet in the morning and 1/2 tablet at night  Dispense: 90 tablet; Refill: 3    3. Hypercholesteremia    4. Bilateral edema of lower extremity  -     hydroCHLOROthiazide 12.5 MG tablet; Once or Twice a day only as needed for increased swelling or increased BP      HTN/palpitations  -BP stable, heart rate and rhythm normal  -Recommend resuming a Lopressor at 12.5 mg twice a day  -Resume low-dose hydrochlorothiazide once or twice daily as needed for increased swelling or increased blood pressure  -BP monitoring form provided  -In office BP check scheduled in 3 weeks.  Patient understands to call sooner for symptoms or concerns.     Hyperlipidemia  -Labs per PCP  - Currently statin on hold.  Consider resuming based on next lipid panel results     Leg edema  - Continue diuretic on as needed basis     6-month follow-up visit scheduled.               Electronically signed by Ewelina Barrera, CALI,  January 16, 2025 14:15 EST    Dictated Utilizing Dragon Dictation: Part of this  note may be an electronic transcription/translation of spoken language to printed text using the Dragon Dictation System.

## 2025-01-17 ENCOUNTER — TELEPHONE (OUTPATIENT)
Dept: NEUROLOGY | Facility: CLINIC | Age: 65
End: 2025-01-17

## 2025-01-17 NOTE — TELEPHONE ENCOUNTER
PATIENT CALLING TO REQUEST NEXT OV 1/22/25 BE A VIDEO VISIT.    SHE HAS LIMITED MOBILITY    PLEASE ADVISE PATIENT VIA PHONE

## 2025-01-22 ENCOUNTER — TELEMEDICINE (OUTPATIENT)
Dept: NEUROLOGY | Facility: CLINIC | Age: 65
End: 2025-01-22
Payer: MEDICARE

## 2025-01-22 DIAGNOSIS — G43.909 ACUTE MIGRAINE: ICD-10-CM

## 2025-01-22 DIAGNOSIS — R41.89 SUBJECTIVE MEMORY COMPLAINTS: Primary | ICD-10-CM

## 2025-01-22 DIAGNOSIS — F33.1 MODERATE RECURRENT MAJOR DEPRESSION: ICD-10-CM

## 2025-01-22 RX ORDER — DONEPEZIL HYDROCHLORIDE 10 MG/1
10 TABLET, FILM COATED ORAL NIGHTLY
Qty: 90 TABLET | Refills: 1 | Status: SHIPPED | OUTPATIENT
Start: 2025-01-22

## 2025-01-22 RX ORDER — DULOXETIN HYDROCHLORIDE 60 MG/1
60 CAPSULE, DELAYED RELEASE ORAL DAILY
Qty: 90 CAPSULE | Refills: 1 | Status: SHIPPED | OUTPATIENT
Start: 2025-01-22

## 2025-01-22 RX ORDER — MEMANTINE HYDROCHLORIDE 10 MG/1
10 TABLET ORAL DAILY
Qty: 90 TABLET | Refills: 1 | Status: SHIPPED | OUTPATIENT
Start: 2025-01-22 | End: 2025-01-22 | Stop reason: SDUPTHER

## 2025-01-22 RX ORDER — DONEPEZIL HYDROCHLORIDE 10 MG/1
10 TABLET, FILM COATED ORAL NIGHTLY
Qty: 90 TABLET | Refills: 1 | Status: SHIPPED | OUTPATIENT
Start: 2025-01-22 | End: 2025-01-22 | Stop reason: SDUPTHER

## 2025-01-22 RX ORDER — DULOXETIN HYDROCHLORIDE 60 MG/1
60 CAPSULE, DELAYED RELEASE ORAL DAILY
Qty: 90 CAPSULE | Refills: 1 | Status: SHIPPED | OUTPATIENT
Start: 2025-01-22 | End: 2025-01-22 | Stop reason: SDUPTHER

## 2025-01-22 RX ORDER — MEMANTINE HYDROCHLORIDE 10 MG/1
10 TABLET ORAL DAILY
Qty: 90 TABLET | Refills: 1 | Status: SHIPPED | OUTPATIENT
Start: 2025-01-22 | End: 2026-01-22

## 2025-01-22 NOTE — PROGRESS NOTES
Follow Up Office Visit      Patient Name: Radha Villa  : 1960   MRN: [id number]     Chief Complaint:    Chief Complaint   Patient presents with    Memory Loss    Migraine    Depression       History of Present Illness: Radha Villa is a 65 y.o. female who is here today to follow up with neurology for subjective memory impairment, acute migraine and depression. She was last seen in clinic 10/23 (Maryanne). This is a telehealth encounter.     Migraine: MDM . She is not having migraines.     Depression: Stable. She has been taking Duloxetine 60 mg Daily and she reports good tolerance and compliance. Improved mood and less anxiety. No SI/HI.     Subjective Memory Impairment: Stable. She has been taking Aricept 10 mg Daily and Namenda 5 mg Daily and she reports good tolerance and compliance. She feels the medications have helped with word finding and she is losing items less. She is driving. Self ADLs. She is managing household finances, cooking and cleaning. No longer using [employer] for BENI    Recent Imaging:      MRA Neck W/WO  - noncontributory   MRI Brain  + Frontal predominant subcortical and periventricular white matter changes, which can be seen as the sequela of migraine HA, chronic small vessel ischemia, or less likely demyelination.   Home Sleep Study  + AHI 12.2 Mild BENI  CDUS  - noncontributory      Pertinent Medical History: OA, PreDM, HL, HTN, hypothyroidism, Vitamin D Deficiency, Chronic Neck Pain ([place]), Carcinoid Tumor ([facility] @ [facility]), Chronic-D, Retina Detachment Left Eye    Subjective      Review of Systems:   Review of Systems   Constitutional: Negative.    HENT: Negative.     Eyes: Negative.    Respiratory: Negative.     Cardiovascular: Negative.    Gastrointestinal: Negative.    Endocrine: Negative.    Genitourinary: Negative.    Musculoskeletal: Negative.    Skin: Negative.    Allergic/Immunologic: Negative.    Neurological:  Positive for memory problem.  Negative for headache.   Hematological: Negative.    Psychiatric/Behavioral: Negative.  Positive for depressed mood.    All other systems reviewed and are negative.      I have reviewed and the following portions of the patient's history were updated as appropriate: past family history, past medical history, past social history, past surgical history and problem list.    Medications:     Current Outpatient Medications:     azelastine (ASTELIN) 0.1 % nasal spray, Administer 1 spray into the nostril(s) as directed by provider 1 (One) Time. Use in each nostril as directed, Disp: , Rfl:     dicyclomine (BENTYL) 10 MG capsule, Take 1 capsule by mouth 2 (Two) Times a Day. (Patient taking differently: Take 1 capsule by mouth Daily.), Disp: 60 capsule, Rfl: 3    donepezil (ARICEPT) 10 MG tablet, Take 1 tablet by mouth Every Night., Disp: 90 tablet, Rfl: 1    DULoxetine (CYMBALTA) 60 MG capsule, Take 1 capsule by mouth Daily., Disp: 90 capsule, Rfl: 1    ELDERBERRY PO, Take  by mouth As Needed., Disp: , Rfl:     EPINEPHrine (EPIPEN) 0.3 MG/0.3ML solution auto-injector injection, Inject 0.3 mL under the skin into the appropriate area as directed 1 (One) Time., Disp: , Rfl:     hydroCHLOROthiazide 12.5 MG tablet, Once or Twice a day only as needed for increased swelling or increased BP, Disp: , Rfl:     hydrocortisone 2.5 % lotion, Apply 1 application  topically to the appropriate area as directed As Needed., Disp: , Rfl:     levothyroxine (SYNTHROID, LEVOTHROID) 50 MCG tablet, Take 1 tablet by mouth Every Morning., Disp: 30 tablet, Rfl: 0    LUTEIN-ZEAXANTHIN-BILBERRY PO, Take 1 tablet by mouth Daily., Disp: , Rfl:     memantine (NAMENDA) 10 MG tablet, Take 1 tablet by mouth Daily., Disp: 90 tablet, Rfl: 1    metoprolol tartrate (LOPRESSOR) 25 MG tablet, 1/2 tablet in the morning and 1/2 tablet at night, Disp: 90 tablet, Rfl: 3    mupirocin (BACTROBAN) 2 % cream, Apply 1 Application topically to the appropriate area as  directed As Needed., Disp: , Rfl:     valACYclovir (VALTREX) 1000 MG tablet, Take 1 tablet by mouth As Needed., Disp: , Rfl:     vitamin D (ERGOCALCIFEROL) 1.25 MG (30030 UT) capsule capsule, Take 1 capsule by mouth Every 7 (Seven) Days., Disp: , Rfl:     acyclovir (ZOVIRAX) 5 % cream, Apply 1 Application topically to the appropriate area as directed As Needed. (Patient not taking: Reported on 1/22/2025), Disp: , Rfl:     albuterol (PROVENTIL) (2.5 MG/3ML) 0.083% nebulizer solution, Take 2.5 mg by nebulization As Needed., Disp: , Rfl:     calcipotriene (DOVONEX) 0.005 % cream, As Needed., Disp: , Rfl:     Cholecalciferol (Vitamin D3) 1.25 MG (87197 UT) tablet, Take 1 tablet by mouth 1 (One) Time Per Week., Disp: , Rfl:     estradiol (ESTRACE) 0.1 MG/GM vaginal cream, , Disp: , Rfl:     fluconazole (DIFLUCAN) 150 MG tablet, Take 1 tablet by mouth Daily., Disp: , Rfl:     levocetirizine (XYZAL) 5 MG tablet, Take 1 tablet by mouth Every Other Day. Evening, Disp: , Rfl:     Allergies:   Allergies   Allergen Reactions    Codeine Nausea And Vomiting    Levofloxacin GI Intolerance    Lisinopril Cough       Objective     Physical Exam:  Vital Signs: There were no vitals filed for this visit.  There is no height or weight on file to calculate BMI.    Physical Exam  Vitals and nursing note reviewed.   Constitutional:       General: She is not in acute distress.     Appearance: Normal appearance.   HENT:      Head: Normocephalic.      Nose: Nose normal.      Mouth/Throat:      Mouth: Mucous membranes are moist.      Pharynx: Oropharynx is clear.   Eyes:      Extraocular Movements: Extraocular movements intact.      Conjunctiva/sclera: Conjunctivae normal.   Musculoskeletal:      Cervical back: Normal range of motion.   Neurological:      Mental Status: She is oriented to person, place, and time.   Psychiatric:         Mood and Affect: Mood normal.         Speech: Speech normal.         Behavior: Behavior normal.          Neurological Exam  Mental Status  Awake, alert and oriented to person, place and time. Oriented to person, place, and time. Recalls 3 of 3 objects immediately. At 5 minutes recalls 3 of 3 objects. Able to copy figure. Speech is normal. Able to name objects, name parts of objects, repeat, read and write. Follows complex commands. Able to perform serial calculations. Able to spell words backwards. Digit span was 5 forward and 5 backward. Fund of knowledge is appropriate for level of education. MMSE score: 28.    Cranial Nerves  CN III, IV, VI: Extraocular movements intact bilaterally.       Assessment / Plan      Assessment/Plan:   Diagnoses and all orders for this visit:    1. Subjective memory complaints (Primary)  -     Discontinue: donepezil (ARICEPT) 10 MG tablet; Take 1 tablet by mouth Every Night.  Dispense: 90 tablet; Refill: 1  -     Discontinue: memantine (NAMENDA) 10 MG tablet; Take 1 tablet by mouth Daily.  Dispense: 90 tablet; Refill: 1  -     memantine (NAMENDA) 10 MG tablet; Take 1 tablet by mouth Daily.  Dispense: 90 tablet; Refill: 1  -     donepezil (ARICEPT) 10 MG tablet; Take 1 tablet by mouth Every Night.  Dispense: 90 tablet; Refill: 1    2. Moderate recurrent major depression  -     Discontinue: DULoxetine (CYMBALTA) 60 MG capsule; Take 1 capsule by mouth Daily.  Dispense: 90 capsule; Refill: 1  -     DULoxetine (CYMBALTA) 60 MG capsule; Take 1 capsule by mouth Daily.  Dispense: 90 capsule; Refill: 1    3. Acute migraine         Follow Up:   Return in about 6 months (around 7/22/2025), or if symptoms worsen or fail to improve.    Anticipatory guidance and safety reviewed  Patient education provided  MMSE 28/30  FAST 2  Continue Aricept 10 mg daily; side effects reviewed  Continue/increase Namenda 10 mg daily; side effects reviewed  Continue Cymbalta 60 mg (prevention); side effects reviewed  Long discussion on mental health promotion strategies: Sleep hygiene, exercise greater than 150  minutes/week, positive social support, self-care, CBT counseling  Declined offer of referral to behavioral health for therapy and medication mgmt   Benefits of treating BENI with [employer] therapy reviewed; risks discussed     RTC PRN or within 6 months or sooner with issues     Twilio Encounter for 21 min with > 50% of encounter spent counseling and coordinating care     Nicolasa Broderick, MENDOZA, APRN, FNP-C  [facility]

## 2025-02-06 ENCOUNTER — CLINICAL SUPPORT (OUTPATIENT)
Dept: CARDIOLOGY | Facility: CLINIC | Age: 65
End: 2025-02-06
Payer: MEDICARE

## 2025-02-06 ENCOUNTER — TELEPHONE (OUTPATIENT)
Dept: CARDIOLOGY | Facility: CLINIC | Age: 65
End: 2025-02-06

## 2025-02-06 VITALS — HEART RATE: 64 BPM | DIASTOLIC BLOOD PRESSURE: 80 MMHG | SYSTOLIC BLOOD PRESSURE: 130 MMHG

## 2025-02-06 DIAGNOSIS — Z01.30 BLOOD PRESSURE CHECK: Primary | ICD-10-CM

## 2025-02-06 NOTE — TELEPHONE ENCOUNTER
Patient came in today for BP check. Patient is taking metoprolol 12.5 mg BID.  BP - 130/80 (right arm)  HR - 64

## 2025-02-06 NOTE — TELEPHONE ENCOUNTER
Attempted to notify patient of recommendations concerning BP check today. Unable to reach, left message for call back.

## 2025-07-23 ENCOUNTER — OFFICE VISIT (OUTPATIENT)
Dept: NEUROLOGY | Facility: CLINIC | Age: 65
End: 2025-07-23
Payer: MEDICARE

## 2025-07-23 VITALS
BODY MASS INDEX: 24.51 KG/M2 | DIASTOLIC BLOOD PRESSURE: 74 MMHG | OXYGEN SATURATION: 97 % | HEIGHT: 65 IN | WEIGHT: 147.1 LBS | RESPIRATION RATE: 14 BRPM | HEART RATE: 68 BPM | SYSTOLIC BLOOD PRESSURE: 136 MMHG | TEMPERATURE: 98.3 F

## 2025-07-23 DIAGNOSIS — R41.89 SUBJECTIVE MEMORY COMPLAINTS: Primary | ICD-10-CM

## 2025-07-23 DIAGNOSIS — F33.1 MODERATE RECURRENT MAJOR DEPRESSION: ICD-10-CM

## 2025-07-23 DIAGNOSIS — F41.9 ANXIETY: ICD-10-CM

## 2025-07-23 RX ORDER — BUSPIRONE HYDROCHLORIDE 10 MG/1
10 TABLET ORAL 2 TIMES DAILY PRN
Qty: 60 TABLET | Refills: 5 | Status: SHIPPED | OUTPATIENT
Start: 2025-07-23

## 2025-07-23 RX ORDER — DONEPEZIL HYDROCHLORIDE 10 MG/1
10 TABLET, FILM COATED ORAL NIGHTLY
Qty: 90 TABLET | Refills: 1 | Status: SHIPPED | OUTPATIENT
Start: 2025-07-23

## 2025-07-23 RX ORDER — DULOXETIN HYDROCHLORIDE 60 MG/1
60 CAPSULE, DELAYED RELEASE ORAL DAILY
Qty: 90 CAPSULE | Refills: 1 | Status: SHIPPED | OUTPATIENT
Start: 2025-07-23

## 2025-07-23 RX ORDER — MEMANTINE HYDROCHLORIDE 10 MG/1
10 TABLET ORAL DAILY
Qty: 90 TABLET | Refills: 1 | Status: SHIPPED | OUTPATIENT
Start: 2025-07-23 | End: 2026-07-23

## 2025-08-05 ENCOUNTER — OFFICE VISIT (OUTPATIENT)
Dept: CARDIOLOGY | Facility: CLINIC | Age: 65
End: 2025-08-05
Payer: MEDICARE

## 2025-08-05 VITALS
BODY MASS INDEX: 24.49 KG/M2 | WEIGHT: 147 LBS | HEIGHT: 65 IN | DIASTOLIC BLOOD PRESSURE: 70 MMHG | HEART RATE: 68 BPM | SYSTOLIC BLOOD PRESSURE: 132 MMHG

## 2025-08-05 DIAGNOSIS — E78.00 HYPERCHOLESTEREMIA: ICD-10-CM

## 2025-08-05 DIAGNOSIS — R60.0 BILATERAL EDEMA OF LOWER EXTREMITY: ICD-10-CM

## 2025-08-05 DIAGNOSIS — R00.2 PALPITATIONS: ICD-10-CM

## 2025-08-05 DIAGNOSIS — I10 PRIMARY HYPERTENSION: Primary | ICD-10-CM

## 2025-08-05 PROCEDURE — 3075F SYST BP GE 130 - 139MM HG: CPT | Performed by: NURSE PRACTITIONER

## 2025-08-05 PROCEDURE — 1160F RVW MEDS BY RX/DR IN RCRD: CPT | Performed by: NURSE PRACTITIONER

## 2025-08-05 PROCEDURE — 1159F MED LIST DOCD IN RCRD: CPT | Performed by: NURSE PRACTITIONER

## 2025-08-05 PROCEDURE — 3078F DIAST BP <80 MM HG: CPT | Performed by: NURSE PRACTITIONER

## 2025-08-05 PROCEDURE — 99214 OFFICE O/P EST MOD 30 MIN: CPT | Performed by: NURSE PRACTITIONER
